# Patient Record
Sex: FEMALE | Race: WHITE | NOT HISPANIC OR LATINO | Employment: OTHER | URBAN - METROPOLITAN AREA
[De-identification: names, ages, dates, MRNs, and addresses within clinical notes are randomized per-mention and may not be internally consistent; named-entity substitution may affect disease eponyms.]

---

## 2018-03-06 ENCOUNTER — HISTORICAL (OUTPATIENT)
Dept: RADIOLOGY | Facility: HOSPITAL | Age: 63
End: 2018-03-06

## 2019-08-07 ENCOUNTER — HISTORICAL (OUTPATIENT)
Dept: RADIOLOGY | Facility: HOSPITAL | Age: 64
End: 2019-08-07

## 2019-08-23 ENCOUNTER — HISTORICAL (OUTPATIENT)
Dept: RADIOLOGY | Facility: HOSPITAL | Age: 64
End: 2019-08-23

## 2019-09-06 ENCOUNTER — HISTORICAL (OUTPATIENT)
Dept: RADIOLOGY | Facility: HOSPITAL | Age: 64
End: 2019-09-06

## 2019-10-11 LAB
ABS NEUT (OLG): 4.4 X10(3)/MCL (ref 1.5–6.9)
BUN SERPL-MCNC: 12 MG/DL (ref 10–20)
CALCIUM SERPL-MCNC: 8.7 MG/DL (ref 8–10.5)
CHLORIDE SERPL-SCNC: 108 MMOL/L (ref 100–108)
CO2 SERPL-SCNC: 28 MMOL/L (ref 21–35)
CREAT SERPL-MCNC: 0.74 MG/DL (ref 0.7–1.3)
CREAT/UREA NIT SERPL: 16
ERYTHROCYTE [DISTWIDTH] IN BLOOD BY AUTOMATED COUNT: 14 % (ref 11.5–17)
GLUCOSE SERPL-MCNC: 96 MG/DL (ref 75–116)
GROUP & RH: NORMAL
HCT VFR BLD AUTO: 37.4 % (ref 36–48)
HGB BLD-MCNC: 11.4 GM/DL (ref 12–16)
MCH RBC QN AUTO: 27 PG (ref 27–34)
MCHC RBC AUTO-ENTMCNC: 30 GM/DL (ref 31–36)
MCV RBC AUTO: 90 FL (ref 80–99)
PLATELET # BLD AUTO: 207 X10(3)/MCL (ref 140–400)
PMV BLD AUTO: 11 FL (ref 6.8–10)
POTASSIUM SERPL-SCNC: 4.6 MMOL/L (ref 3.6–5.2)
RBC # BLD AUTO: 4.16 X10(6)/MCL (ref 4.2–5.4)
SODIUM SERPL-SCNC: 142 MMOL/L (ref 135–145)
WBC # SPEC AUTO: 6.4 X10(3)/MCL (ref 4.5–11.5)

## 2019-10-15 ENCOUNTER — HISTORICAL (OUTPATIENT)
Dept: ANESTHESIOLOGY | Facility: HOSPITAL | Age: 64
End: 2019-10-15

## 2019-10-31 ENCOUNTER — HISTORICAL (OUTPATIENT)
Dept: ADMINISTRATIVE | Facility: HOSPITAL | Age: 64
End: 2019-10-31

## 2019-11-14 ENCOUNTER — HISTORICAL (OUTPATIENT)
Dept: RADIOLOGY | Facility: HOSPITAL | Age: 64
End: 2019-11-14

## 2021-03-19 ENCOUNTER — HISTORICAL (OUTPATIENT)
Dept: ADMINISTRATIVE | Facility: HOSPITAL | Age: 66
End: 2021-03-19

## 2021-03-19 LAB
ALBUMIN SERPL-MCNC: 3.8 G/DL (ref 3.8–4.8)
ALBUMIN/GLOB SERPL: 1.4 {RATIO} (ref 1.2–2.2)
ALP SERPL-CCNC: 110 IU/L (ref 39–117)
ALT SERPL-CCNC: 10 IU/L (ref 0–32)
AST SERPL-CCNC: 22 IU/L (ref 0–40)
BASOPHILS # BLD AUTO: 0 X10E3/UL (ref 0–0.2)
BASOPHILS NFR BLD AUTO: 0 %
BILIRUB SERPL-MCNC: <0.2 MG/DL (ref 0–1.2)
BUN SERPL-MCNC: 11 MG/DL (ref 8–27)
CALCIUM SERPL-MCNC: 8.7 MG/DL (ref 8.7–10.3)
CHLORIDE SERPL-SCNC: 108 MMOL/L (ref 96–106)
CHOLEST SERPL-MCNC: 169 MG/DL (ref 100–199)
CHOLEST/HDLC SERPL: 4.3 RATIO (ref 0–4.4)
CO2 SERPL-SCNC: 23 MMOL/L (ref 20–29)
CREAT SERPL-MCNC: 0.89 MG/DL (ref 0.57–1)
CREAT/UREA NIT SERPL: 12 (ref 12–28)
EOSINOPHIL # BLD AUTO: 0 X10E3/UL (ref 0–0.4)
EOSINOPHIL NFR BLD AUTO: 0 %
ERYTHROCYTE [DISTWIDTH] IN BLOOD BY AUTOMATED COUNT: 14.9 % (ref 11.7–15.4)
GLOBULIN SER-MCNC: 2.7 G/DL (ref 1.5–4.5)
GLUCOSE SERPL-MCNC: 119 MG/DL (ref 65–99)
HBA1C MFR BLD: 5.9 % (ref 4.8–5.6)
HCT VFR BLD AUTO: 32.4 % (ref 34–46.6)
HDLC SERPL-MCNC: 39 MG/DL
HGB BLD-MCNC: 11.7 G/DL (ref 11.1–15.9)
LDLC SERPL CALC-MCNC: 88 MG/DL (ref 0–99)
LYMPHOCYTES # BLD AUTO: 1.6 X10E3/UL (ref 0.7–3.1)
LYMPHOCYTES NFR BLD AUTO: 26 %
MCH RBC QN AUTO: 36.1 PG (ref 26.6–33)
MCHC RBC AUTO-ENTMCNC: 36.1 G/DL (ref 31.5–35.7)
MCV RBC AUTO: 100 FL (ref 79–97)
MONOCYTES # BLD AUTO: 0.4 X10E3/UL (ref 0.1–0.9)
MONOCYTES NFR BLD AUTO: 7 %
NEUTROPHILS # BLD AUTO: 4.2 X10E3/UL (ref 1.4–7)
NEUTROPHILS NFR BLD AUTO: 67 %
PLATELET # BLD AUTO: 240 X10E3/UL (ref 150–450)
POTASSIUM SERPL-SCNC: 4.4 MMOL/L (ref 3.5–5.2)
PROT SERPL-MCNC: 6.5 G/DL (ref 6–8.5)
RBC # BLD AUTO: 3.24 X10(6)/MCL (ref 3.77–5.28)
SODIUM SERPL-SCNC: 142 MMOL/L (ref 134–144)
TRIGL SERPL-MCNC: 249 MG/DL (ref 0–149)
VLDLC SERPL CALC-MCNC: 42 MG/DL (ref 5–40)
WBC # SPEC AUTO: 6.2 X10E3/UL (ref 3.4–10.8)

## 2021-03-24 ENCOUNTER — HISTORICAL (OUTPATIENT)
Dept: ADMINISTRATIVE | Facility: HOSPITAL | Age: 66
End: 2021-03-24

## 2021-04-09 ENCOUNTER — HISTORICAL (OUTPATIENT)
Dept: ADMINISTRATIVE | Facility: HOSPITAL | Age: 66
End: 2021-04-09

## 2021-04-12 ENCOUNTER — HISTORICAL (OUTPATIENT)
Dept: ENDOSCOPY | Facility: HOSPITAL | Age: 66
End: 2021-04-12

## 2021-09-24 LAB
ALBUMIN SERPL-MCNC: 3.8 G/DL (ref 3.4–5)
ALBUMIN/GLOB SERPL: 1.3 {RATIO}
ALP SERPL-CCNC: 134 U/L (ref 50–144)
ALT SERPL-CCNC: 13 U/L (ref 1–45)
ANION GAP SERPL CALC-SCNC: 10 MMOL/L (ref 7–16)
AST SERPL-CCNC: 24 U/L (ref 14–36)
BASOPHILS # BLD AUTO: 0.01 X10(3)/MCL (ref 0.01–0.08)
BASOPHILS NFR BLD AUTO: 0.2 % (ref 0.1–1.2)
BILIRUB SERPL-MCNC: 0.32 MG/DL (ref 0.1–1)
BUN SERPL-MCNC: 13 MG/DL (ref 7–20)
CALCIUM SERPL-MCNC: 9 MG/DL (ref 8.4–10.2)
CHLORIDE SERPL-SCNC: 107 MMOL/L (ref 94–110)
CHOLEST SERPL-MCNC: 201 MG/DL (ref 0–200)
CO2 SERPL-SCNC: 26 MMOL/L (ref 21–32)
CREAT SERPL-MCNC: 0.8 MG/DL (ref 0.52–1.04)
CREAT/UREA NIT SERPL: 16.3 (ref 12–20)
EOSINOPHIL # BLD AUTO: 0 X10(3)/MCL (ref 0.04–0.36)
EOSINOPHIL NFR BLD AUTO: 0 % (ref 0.7–7)
ERYTHROCYTE [DISTWIDTH] IN BLOOD BY AUTOMATED COUNT: 14.6 % (ref 11–14.5)
EST. AVERAGE GLUCOSE BLD GHB EST-MCNC: 111 MG/DL (ref 70–115)
GLOBULIN SER-MCNC: 2.9 G/DL (ref 2–3.9)
GLUCOSE SERPL-MCNC: 106 MGM./DL (ref 70–115)
HBA1C MFR BLD: 5.7 % (ref 4–6)
HCT VFR BLD AUTO: 39 % (ref 36–48)
HDLC SERPL-MCNC: 40 MG/DL (ref 40–60)
HGB BLD-MCNC: 11.8 G/DL (ref 11.8–16)
IMM GRANULOCYTES # BLD AUTO: 0.01 X10E3/UL (ref 0–0.03)
IMM GRANULOCYTES NFR BLD AUTO: 0.2 % (ref 0–0.5)
LDLC SERPL CALC-MCNC: 98.2 MG/DL (ref 30–100)
LYMPHOCYTES # BLD AUTO: 1.62 X10(3)/MCL (ref 1.16–3.74)
LYMPHOCYTES NFR BLD AUTO: 25.8 % (ref 20–55)
MCH RBC QN AUTO: 27.1 PG (ref 27–34)
MCHC RBC AUTO-ENTMCNC: 30.3 G/DL (ref 31–37)
MCV RBC AUTO: 89.4 FL (ref 79–99)
MONOCYTES # BLD AUTO: 0.43 X10(3)/MCL (ref 0.24–0.36)
MONOCYTES NFR BLD AUTO: 6.8 % (ref 4.7–12.5)
NEUTROPHILS # BLD AUTO: 4.22 X10(3)/MCL (ref 1.56–6.13)
NEUTROPHILS NFR BLD AUTO: 67 % (ref 37–73)
PLATELET # BLD AUTO: 269 X10(3)/MCL (ref 140–371)
PMV BLD AUTO: 12 FL (ref 9.4–12.4)
POTASSIUM SERPL-SCNC: 4.4 MMOL/L (ref 3.5–5.1)
PROT SERPL-MCNC: 6.7 G/DL (ref 6.3–8.2)
RBC # BLD AUTO: 4.36 X10(6)/MCL (ref 4–5.1)
SODIUM SERPL-SCNC: 143 MMOL/L (ref 135–145)
TRIGL SERPL-MCNC: 256 MG/DL (ref 30–200)
TSH SERPL-ACNC: 3.85 UIU/ML (ref 0.36–3.74)
WBC # SPEC AUTO: 6.3 X10(3)/MCL (ref 4–11.5)

## 2021-10-13 ENCOUNTER — HISTORICAL (OUTPATIENT)
Dept: ADMINISTRATIVE | Facility: HOSPITAL | Age: 66
End: 2021-10-13

## 2022-03-21 LAB
AGE: 66
ALBUMIN SERPL-MCNC: 4.3 G/DL (ref 3.4–5)
ALBUMIN/GLOB SERPL: 1.7 {RATIO}
ALP SERPL-CCNC: 101 U/L (ref 50–144)
ALT SERPL-CCNC: 14 U/L (ref 1–45)
ANION GAP SERPL CALC-SCNC: 9 MMOL/L (ref 2–13)
AST SERPL-CCNC: 31 U/L (ref 14–36)
BASOPHILS # BLD AUTO: 0.02 10*3/UL (ref 0.01–0.08)
BASOPHILS NFR BLD AUTO: 0.3 % (ref 0.1–1.2)
BILIRUB SERPL-MCNC: 0.4 MG/DL (ref 0–1)
BUN SERPL-MCNC: 20 MG/DL (ref 7–20)
CALCIUM SERPL-MCNC: 9.6 MG/DL (ref 8.4–10.2)
CHLORIDE SERPL-SCNC: 108 MMOL/L (ref 94–110)
CHOLEST SERPL-MCNC: 191 MG/DL (ref 0–200)
CO2 SERPL-SCNC: 25 MMOL/L (ref 21–32)
CREAT SERPL-MCNC: 0.7 MG/DL (ref 0.52–1.04)
CREAT/UREA NIT SERPL: 28.6 (ref 12–20)
EOSINOPHIL # BLD AUTO: 0 10*3/UL (ref 0.04–0.36)
EOSINOPHIL NFR BLD AUTO: 0 % (ref 0.7–7)
ERYTHROCYTE [DISTWIDTH] IN BLOOD BY AUTOMATED COUNT: 16 % (ref 11–14.5)
EST. AVERAGE GLUCOSE BLD GHB EST-MCNC: 93 MG/DL (ref 70–115)
GLOBULIN SER-MCNC: 2.6 G/DL (ref 2–3.9)
GLUCOSE SERPL-MCNC: 102 MG/DL (ref 70–115)
HBA1C MFR BLD: 5.1 % (ref 4–6)
HCT VFR BLD AUTO: 39.5 % (ref 36–48)
HDLC SERPL-MCNC: 42 MG/DL (ref 40–60)
HGB BLD-MCNC: 12.4 G/DL (ref 11.8–16)
IMM GRANULOCYTES # BLD AUTO: 0.01 10*3/UL (ref 0–0.03)
IMM GRANULOCYTES NFR BLD AUTO: 0.1 % (ref 0–0.5)
LDLC SERPL CALC-MCNC: 100 MG/DL (ref 30–100)
LYMPHOCYTES # BLD AUTO: 2.09 10*3/UL (ref 1.16–3.74)
LYMPHOCYTES NFR BLD AUTO: 30.2 % (ref 20–55)
MANUAL DIFF? (OHS): NORMAL
MCH RBC QN AUTO: 27.8 PG (ref 27–34)
MCHC RBC AUTO-ENTMCNC: 31.4 G/DL (ref 31–37)
MCV RBC AUTO: 88.6 FL (ref 79–99)
MONOCYTES # BLD AUTO: 0.52 10*3/UL (ref 0.24–0.36)
MONOCYTES NFR BLD AUTO: 7.5 % (ref 4.7–12.5)
NEUTROPHILS # BLD AUTO: 4.27 10*3/UL (ref 1.56–6.13)
NEUTROPHILS NFR BLD AUTO: 61.9 % (ref 37–73)
PLATELET # BLD AUTO: 270 10*3/UL (ref 140–371)
PMV BLD AUTO: 12.6 FL (ref 9.4–12.4)
POTASSIUM SERPL-SCNC: 4 MMOL/L (ref 3.5–5.1)
PROT SERPL-MCNC: 6.9 G/DL (ref 6.3–8.2)
RBC # BLD AUTO: 4.46 10*6/UL (ref 4–5.1)
SODIUM SERPL-SCNC: 142 MMOL/L (ref 135–145)
TRIGL SERPL-MCNC: 191 MG/DL (ref 30–200)
TSH SERPL-ACNC: 3.45 M[IU]/L (ref 0.36–3.74)
WBC # SPEC AUTO: 6.9 10*3/UL (ref 4–11.5)

## 2022-04-11 ENCOUNTER — HISTORICAL (OUTPATIENT)
Dept: ADMINISTRATIVE | Facility: HOSPITAL | Age: 67
End: 2022-04-11

## 2022-04-25 VITALS
SYSTOLIC BLOOD PRESSURE: 126 MMHG | OXYGEN SATURATION: 97 % | BODY MASS INDEX: 49.69 KG/M2 | HEIGHT: 63 IN | WEIGHT: 280.44 LBS | DIASTOLIC BLOOD PRESSURE: 88 MMHG

## 2022-04-30 NOTE — H&P
Patient:   Swati Connor            MRN: [id number]            FIN: [id number]               Age:   65 years     Sex:  Female     :  1955   Associated Diagnoses:   None   Author:   ANA Aguayo MD      Chief Complaint   Recurrence of upper abdominal pain some solid food dysphagia and chronic heartburn and reflux not controlled by medications      History of Present Illness   This is a 65-year-old individual resident of living in [place] patient of Dr. Hua Brown with over a decade of worsening burning regurgitation nonexertional chest discomfort that has not responded particularly well to medical management.  She is above ideal body weight.  Hypertensive, has osteoarthritis and glucose intolerance.  She is on no anticoagulants and denies any significant upper abdominal surgery.      Physical Examination   General:  Alert and oriented, Increased BMI.    Eye:  Pupils are equal, round and reactive to light.    Neck:  Supple.    Respiratory:  Respirations are non-labored.    Cardiovascular:  Normal rate.    Gastrointestinal:  Soft.       Impression and Plan   Gastro-soft reflux disease, hiatal hernia, rule out ulcer disease stricture less likely achalasia    ANA Aguayo M.D.

## 2022-04-30 NOTE — OP NOTE
Patient:   Swati Connor            MRN: [id number]            FIN: [id number]               Age:   63 years     Sex:  Female     :  1955   Associated Diagnoses:   None   Author:   Francisco Calle MD      Operative Note   Operative Information   Procedures Performed: InterStim removal, bladder injections of Botox.     Preoperative Diagnosis: Mixed urinary incontinence.     Postoperative Diagnosis: Same.     Surgeon: Francisco Calle MD.     Anesthesia: IV sedation.     Speciman Removed: InterStim generator and lead.     Description of Procedure/Findings/    Complications: Reason with the upper airway shows placed under IV sedation she was placed in the prone position she wasn't sure which side her InterStim had been placed because she had a place and removed supposedly delay we did see a scar patient's right upper buttock and felt that it was palpable in that area and incision was made over the area and after exploration of the pocket we cannot find InterStim generator.  Since left side again saw another incision site and it was very difficult to tell how where the generator was because the patient's body habitus we explored the patient's pocket on the right side that time was able to locate the generator generator lead was cut from Gen. generator was removed another in skin incision was made over the midline portion where the lead was and the lead was pulled up from its and source insertion site and appeared to be intact lead in the InterStim generator and removed without difficulties.  Cautery was used to obtain excellent hemostasis and all subcu incisions were closed with 3-0 Vicryl and 3-0 Vicryl was used to close the skin with a subcuticular fashion and the patient was changed positions in the dorsal lithotomy position she was again reprepped and redraped cystoscopy was performed noting normal bladder integrity and good flow of urine through both ureteral meatuses.  10 mL of dilute Botox was  then injected and half cc increments and a 5 x 4 pattern in the chest just beyond the level of the ureteral insertion into the bladder.  The time procedure was then terminated on instruments were removed under direct visualization she tolerated the procedure well was transferred to recovery in stable condition.     Esimated blood loss: No blood loss.     Findings: Normal-appearing bladder integrity with good urine flow through both ureteral meatus meatuses.     Complications: None.

## 2022-05-09 ENCOUNTER — HISTORICAL (OUTPATIENT)
Dept: ADMINISTRATIVE | Facility: HOSPITAL | Age: 67
End: 2022-05-09

## 2023-02-09 ENCOUNTER — TELEPHONE (OUTPATIENT)
Dept: FAMILY MEDICINE | Facility: CLINIC | Age: 68
End: 2023-02-09
Payer: MEDICARE

## 2023-02-09 DIAGNOSIS — E11.9 DIABETES MELLITUS WITHOUT COMPLICATION: Primary | ICD-10-CM

## 2023-02-09 RX ORDER — SEMAGLUTIDE 1.34 MG/ML
0.25 INJECTION, SOLUTION SUBCUTANEOUS
Qty: 1 PEN | Refills: 3 | Status: SHIPPED | OUTPATIENT
Start: 2023-02-09 | End: 2023-04-20

## 2023-02-09 RX ORDER — SEMAGLUTIDE 1.34 MG/ML
INJECTION, SOLUTION SUBCUTANEOUS
COMMUNITY
Start: 2023-01-04 | End: 2023-02-09 | Stop reason: SDUPTHER

## 2023-02-09 NOTE — TELEPHONE ENCOUNTER
----- Message from Anahi Castro sent at 2/9/2023 10:46 AM CST -----  Regarding: medication refill  Refill on ozempic sent to [facility] out patient [id number]  [phone]

## 2023-02-22 DIAGNOSIS — M19.90 OSTEOARTHRITIS, UNSPECIFIED OSTEOARTHRITIS TYPE, UNSPECIFIED SITE: Primary | ICD-10-CM

## 2023-02-22 RX ORDER — HYDROCODONE BITARTRATE AND ACETAMINOPHEN 10; 325 MG/1; MG/1
1 TABLET ORAL EVERY 6 HOURS PRN
COMMUNITY
Start: 2023-01-17 | End: 2023-02-22 | Stop reason: SDUPTHER

## 2023-02-22 RX ORDER — HYDROCODONE BITARTRATE AND ACETAMINOPHEN 10; 325 MG/1; MG/1
1 TABLET ORAL EVERY 6 HOURS PRN
Qty: 30 TABLET | Refills: 0 | Status: SHIPPED | OUTPATIENT
Start: 2023-02-22 | End: 2023-03-21 | Stop reason: SDUPTHER

## 2023-02-22 NOTE — TELEPHONE ENCOUNTER
----- Message from Laura Sandoval sent at 2/22/2023 10:49 AM CST -----  Regarding: Refil    Norco 10 mg-325 mg oral tablet      [facility]

## 2023-03-16 ENCOUNTER — TELEPHONE (OUTPATIENT)
Dept: FAMILY MEDICINE | Facility: CLINIC | Age: 68
End: 2023-03-16
Payer: MEDICARE

## 2023-03-16 DIAGNOSIS — F41.9 ANXIETY: Primary | ICD-10-CM

## 2023-03-16 RX ORDER — LISINOPRIL 40 MG/1
40 TABLET ORAL DAILY
COMMUNITY
Start: 2023-03-09 | End: 2023-07-26

## 2023-03-16 RX ORDER — LORAZEPAM 1 MG/1
2 TABLET ORAL NIGHTLY
COMMUNITY
Start: 2023-02-16 | End: 2023-07-05

## 2023-03-16 RX ORDER — LORAZEPAM 1 MG/1
2 TABLET ORAL NIGHTLY
Qty: 60 TABLET | Refills: 0 | Status: CANCELLED | OUTPATIENT
Start: 2023-03-16

## 2023-03-16 RX ORDER — ATORVASTATIN CALCIUM 40 MG/1
40 TABLET, FILM COATED ORAL NIGHTLY
COMMUNITY
Start: 2023-02-01 | End: 2024-01-26

## 2023-03-16 RX ORDER — CELECOXIB 200 MG/1
200 CAPSULE ORAL DAILY
COMMUNITY
Start: 2022-10-24 | End: 2023-07-26

## 2023-03-16 RX ORDER — MONTELUKAST SODIUM 10 MG/1
10 TABLET ORAL DAILY
COMMUNITY
Start: 2023-02-22 | End: 2023-07-17 | Stop reason: SDUPTHER

## 2023-03-16 RX ORDER — GABAPENTIN 600 MG/1
600 TABLET ORAL 3 TIMES DAILY
COMMUNITY
Start: 2023-03-09 | End: 2023-07-05

## 2023-03-21 DIAGNOSIS — M19.90 OSTEOARTHRITIS, UNSPECIFIED OSTEOARTHRITIS TYPE, UNSPECIFIED SITE: ICD-10-CM

## 2023-03-21 DIAGNOSIS — G47.00 INSOMNIA, UNSPECIFIED TYPE: Primary | ICD-10-CM

## 2023-03-21 RX ORDER — ZOLPIDEM TARTRATE 10 MG/1
10 TABLET ORAL NIGHTLY PRN
COMMUNITY
Start: 2022-12-22 | End: 2023-03-21 | Stop reason: SDUPTHER

## 2023-03-21 RX ORDER — ZOLPIDEM TARTRATE 10 MG/1
10 TABLET ORAL NIGHTLY PRN
Qty: 90 TABLET | Refills: 0 | Status: SHIPPED | OUTPATIENT
Start: 2023-03-21 | End: 2023-06-19 | Stop reason: SDUPTHER

## 2023-03-21 RX ORDER — HYDROCODONE BITARTRATE AND ACETAMINOPHEN 10; 325 MG/1; MG/1
1 TABLET ORAL EVERY 6 HOURS PRN
Qty: 30 TABLET | Refills: 0 | Status: SHIPPED | OUTPATIENT
Start: 2023-03-21 | End: 2023-04-20 | Stop reason: SDUPTHER

## 2023-04-17 PROCEDURE — 85025 COMPLETE CBC W/AUTO DIFF WBC: CPT | Performed by: FAMILY MEDICINE

## 2023-04-17 PROCEDURE — 83036 HEMOGLOBIN GLYCOSYLATED A1C: CPT | Performed by: FAMILY MEDICINE

## 2023-04-17 PROCEDURE — 80061 LIPID PANEL: CPT | Performed by: FAMILY MEDICINE

## 2023-04-17 PROCEDURE — 84443 ASSAY THYROID STIM HORMONE: CPT | Performed by: FAMILY MEDICINE

## 2023-04-17 PROCEDURE — 80053 COMPREHEN METABOLIC PANEL: CPT | Performed by: FAMILY MEDICINE

## 2023-04-20 ENCOUNTER — OFFICE VISIT (OUTPATIENT)
Dept: FAMILY MEDICINE | Facility: CLINIC | Age: 68
End: 2023-04-20
Payer: MEDICARE

## 2023-04-20 VITALS
OXYGEN SATURATION: 99 % | SYSTOLIC BLOOD PRESSURE: 128 MMHG | BODY MASS INDEX: 44.26 KG/M2 | DIASTOLIC BLOOD PRESSURE: 72 MMHG | TEMPERATURE: 97 F | HEIGHT: 63 IN | WEIGHT: 249.81 LBS | HEART RATE: 75 BPM

## 2023-04-20 DIAGNOSIS — E78.5 HYPERLIPIDEMIA, UNSPECIFIED HYPERLIPIDEMIA TYPE: ICD-10-CM

## 2023-04-20 DIAGNOSIS — I10 PRIMARY HYPERTENSION: ICD-10-CM

## 2023-04-20 DIAGNOSIS — M19.90 OSTEOARTHRITIS, UNSPECIFIED OSTEOARTHRITIS TYPE, UNSPECIFIED SITE: ICD-10-CM

## 2023-04-20 DIAGNOSIS — D64.9 ANEMIA, UNSPECIFIED TYPE: ICD-10-CM

## 2023-04-20 DIAGNOSIS — Z00.00 MEDICARE ANNUAL WELLNESS VISIT, SUBSEQUENT: ICD-10-CM

## 2023-04-20 DIAGNOSIS — F41.9 ANXIETY: ICD-10-CM

## 2023-04-20 DIAGNOSIS — G89.4 CHRONIC PAIN SYNDROME: Primary | ICD-10-CM

## 2023-04-20 DIAGNOSIS — E66.01 CLASS 3 SEVERE OBESITY DUE TO EXCESS CALORIES WITH SERIOUS COMORBIDITY AND BODY MASS INDEX (BMI) OF 40.0 TO 44.9 IN ADULT: ICD-10-CM

## 2023-04-20 DIAGNOSIS — E11.9 TYPE 2 DIABETES MELLITUS WITHOUT COMPLICATION, WITHOUT LONG-TERM CURRENT USE OF INSULIN: ICD-10-CM

## 2023-04-20 DIAGNOSIS — F32.A DEPRESSIVE DISORDER: ICD-10-CM

## 2023-04-20 PROBLEM — E66.813 CLASS 3 SEVERE OBESITY WITH BODY MASS INDEX (BMI) OF 40.0 TO 44.9 IN ADULT: Status: ACTIVE | Noted: 2023-04-20

## 2023-04-20 PROCEDURE — G0439 PR MEDICARE ANNUAL WELLNESS SUBSEQUENT VISIT: ICD-10-PCS | Mod: ,,, | Performed by: FAMILY MEDICINE

## 2023-04-20 PROCEDURE — G0439 PPPS, SUBSEQ VISIT: HCPCS | Mod: ,,, | Performed by: FAMILY MEDICINE

## 2023-04-20 RX ORDER — VENLAFAXINE HYDROCHLORIDE 150 MG/1
150 CAPSULE, EXTENDED RELEASE ORAL 2 TIMES DAILY
COMMUNITY
Start: 2023-04-06 | End: 2023-10-24 | Stop reason: SDUPTHER

## 2023-04-20 RX ORDER — HYDROCODONE BITARTRATE AND ACETAMINOPHEN 10; 325 MG/1; MG/1
1 TABLET ORAL EVERY 6 HOURS PRN
Qty: 30 TABLET | Refills: 0 | Status: SHIPPED | OUTPATIENT
Start: 2023-04-20 | End: 2023-05-19 | Stop reason: SDUPTHER

## 2023-04-20 NOTE — PROGRESS NOTES
SUBJECTIVE:  Swati Connor is a 67 y.o. female here for Follow-up (6 month lab f/u)      Follow-up  Associated symptoms include arthralgias and myalgias. Pertinent negatives include no abdominal pain, chest pain, congestion, coughing, fatigue, fever, headaches, joint swelling, rash, sore throat or weakness.   Here for annual Medicare wellness and follow-up on chronic medical conditions.  See assessment and plan for individual list of issues.  Kyree allergies, medications, history, and problem list were updated as appropriate.    Review of Systems   Constitutional:  Negative for activity change, appetite change, fatigue and fever.   HENT:  Negative for congestion, ear pain, hearing loss, sore throat and trouble swallowing.    Eyes:  Negative for photophobia, pain, redness and visual disturbance.   Respiratory:  Negative for cough, chest tightness, shortness of breath and wheezing.    Cardiovascular:  Negative for chest pain, palpitations and leg swelling.   Gastrointestinal:  Negative for abdominal distention, abdominal pain and blood in stool.   Endocrine: Negative for cold intolerance, heat intolerance, polydipsia and polyuria.   Genitourinary:  Negative for difficulty urinating, dysuria and frequency.   Musculoskeletal:  Positive for arthralgias, back pain and myalgias. Negative for gait problem and joint swelling.   Skin:  Negative for color change, pallor and rash.   Allergic/Immunologic: Negative.    Neurological:  Negative for dizziness, seizures, speech difficulty, weakness and headaches.   Hematological:  Negative for adenopathy. Does not bruise/bleed easily.   Psychiatric/Behavioral:  Negative for agitation and confusion.     A comprehensive review of symptoms was completed and negative except as noted above.    Recent Results (from the past 504 hour(s))   Comprehensive Metabolic Panel    Collection Time: 04/17/23  8:35 AM   Result Value Ref Range    Sodium Level 142 135 - 145 mmol/L    Potassium  Level 4.0 3.5 - 5.1 mmol/L    Chloride 111 (H) 98 - 110 mmol/L    Carbon Dioxide 21 21 - 32 mmol/L    Glucose Level 118 (H) 70 - 115 mg/dL    Blood Urea Nitrogen 17.0 7.0 - 20.0 mg/dL    Creatinine 0.95 0.66 - 1.25 mg/dL    Calcium Level Total 9.5 8.4 - 10.2 mg/dL    Protein Total 6.9 6.3 - 8.2 gm/dL    Albumin Level 3.9 3.4 - 5.0 g/dL    Globulin 3.0 2.0 - 3.9 gm/dL    Albumin/Globulin Ratio 1.3 ratio    Bilirubin Total 0.3 0.0 - 1.0 mg/dL    Alkaline Phosphatase 128 50 - 144 unit/L    Alanine Aminotransferase 12 1 - 45 unit/L    Aspartate Aminotransferase 27 14 - 36 unit/L    eGFR 66 mls/min/1.73/m2    Anion Gap 10.0 2.0 - 13.0 mEq/L    BUN/Creatinine Ratio 18 12 - 20   Hemoglobin A1C    Collection Time: 04/17/23  8:35 AM   Result Value Ref Range    Hemoglobin A1c 5.3 4.0 - 6.0 %    Estimated Average Glucose 105.4 70.0 - 115.0 mg/dL   Lipid Panel    Collection Time: 04/17/23  8:35 AM   Result Value Ref Range    Cholesterol Total 184 0 - 200 mg/dL    HDL Cholesterol 38 (L) 40 - 60 mg/dL    Triglyceride 247 (H) 30 - 200 mg/dL    LDL Cholesterol Direct 82.8 30.0 - 100.0 mg/dL   TSH    Collection Time: 04/17/23  8:35 AM   Result Value Ref Range    Thyroid Stimulating Hormone 5.870 (H) 0.360 - 3.740 uIU/mL   CBC with Differential    Collection Time: 04/17/23  8:35 AM   Result Value Ref Range    WBC 8.4 4.0 - 11.5 x10(3)/mcL    RBC 3.87 (L) 4.00 - 5.10 x10(6)/mcL    Hgb 12.6 11.8 - 16.0 g/dL    Hct 35.2 (L) 36.0 - 48.0 %    MCV 91.0 79.0 - 99.0 fL    MCH 32.6 27.0 - 34.0 pg    MCHC 35.8 31.0 - 37.0 g/dL    RDW 14.0 11.0 - 14.5 %    Platelet 300 140 - 371 x10(3)/mcL    MPV 12.5 (H) 9.4 - 12.4 fL    Neut % 72.3 37 - 73 %    Lymph % 21.9 20 - 55 %    Mono % 5.2 4.7 - 12.5 %    Eos % 0.0 (L) 0.7 - 7 %    Basophil % 0.2 0.1 - 1.2 %    Lymph # 1.84 1.16 - 3.74 x10(3)/mcL    Neut # 6.09 1.56 - 6.13 x10(3)/mcL    Mono # 0.44 (H) 0.24 - 0.36 x10(3)/mcL    Eos # 0.00 (L) 0.04 - 0.36 x10(3)/mcL    Baso # 0.02 0.01 - 0.08  "x10(3)/mcL    IG# 0.03 0.0001 - 0.031 x10(3)/mcL    IG% 0.4 0 - 0.5 %    NRBC% 0.0 0 - 1 %       OBJECTIVE:  Vital signs  Vitals:    04/20/23 1104   BP: 128/72   BP Location: Left arm   Pulse: 75   Temp: 96.8 °F (36 °C)   TempSrc: Temporal   SpO2: 99%   Weight: 113.3 kg (249 lb 12.8 oz)   Height: 5' 2.6" (1.59 m)        Physical Exam  Constitutional:       Appearance: Normal appearance.   HENT:      Head: Normocephalic and atraumatic.      Right Ear: External ear normal.      Left Ear: External ear normal.      Nose: Nose normal.      Mouth/Throat:      Mouth: Mucous membranes are moist.      Pharynx: Oropharynx is clear.   Eyes:      Extraocular Movements: Extraocular movements intact.      Conjunctiva/sclera: Conjunctivae normal.      Pupils: Pupils are equal, round, and reactive to light.   Cardiovascular:      Rate and Rhythm: Normal rate and regular rhythm.      Heart sounds: Normal heart sounds. No murmur heard.  Pulmonary:      Effort: Pulmonary effort is normal.      Breath sounds: Normal breath sounds. No wheezing or rhonchi.   Abdominal:      General: Abdomen is flat.      Palpations: Abdomen is soft.   Musculoskeletal:         General: Normal range of motion.      Cervical back: Normal range of motion and neck supple.      Comments: She stands up a little slow and walks with a little antalgic gait from pain   Skin:     General: Skin is warm and dry.   Neurological:      General: No focal deficit present.      Mental Status: She is alert and oriented to person, place, and time.      Gait: Gait abnormal.   Psychiatric:         Mood and Affect: Mood normal.         Behavior: Behavior normal.         Thought Content: Thought content normal.         Judgment: Judgment normal.        ASSESSMENT/PLAN:  1. Chronic pain syndrome  She is currently on Norco 10 mg that she takes in the morning to try to help her get through her work she has to do at her house.  She says the pain is the biggest problem she is having " now she is also on Celebrex daily and Neurontin but does not feel it helps much.    2. Class 3 severe obesity due to excess calories with serious comorbidity and body mass index (BMI) of 40.0 to 44.9 in adult  She was losing weight with a GLP 1 agonist however it was becoming too expensive so she does not want to take this anymore    3. Depressive disorder  Continue venlafaxine    4. Anxiety      5. Hyperlipidemia, unspecified hyperlipidemia type  LDL 82 HDL 38 triglycerides 247 on atorvastatin 40 mg daily    6. Primary hypertension  Blood pressure is well controlled lisinopril  Overview:  Last Assessment & Plan:   Formatting of this note might be different from the original.  - takes lisinopril  - The current medical regimen is effective;  continue present plan and medications.      7. Anemia, unspecified type  Hemoglobin is currently 12.6 and normal with a normal MCV    8. Type 2 diabetes mellitus without complication, without long-term current use of insulin  A1c is 5.3    9. Osteoarthritis, unspecified osteoarthritis type, unspecified site    -     HYDROcodone-acetaminophen (NORCO)  mg per tablet; Take 1 tablet by mouth every 6 (six) hours as needed for Pain.  Dispense: 30 tablet; Refill: 0    10. Medicare annual wellness visit, subsequent  Up-to-date on colon cancer screening.  She does not want to do mammogram    She is not currently getting any other Medicare services    I offered to discuss advanced care planning,  and how to pick a person who would make decisions for you if you were unable to make them for herself, called a health care power of , and what kind of decisions you might make such as use of life-sustaining treatments such as ventilators and tube feeding when faced with a life-limiting illness recorded on a living will that they will need to know.( How to be cared for as you near the end of your natural life)    Patient is interested in learning more about how to make advance  directives.  I provided them paperwork and offered to discuss this with them.          Follow Up:  Follow up in about 3 months (around 7/20/2023).

## 2023-05-02 ENCOUNTER — TELEPHONE (OUTPATIENT)
Dept: FAMILY MEDICINE | Facility: CLINIC | Age: 68
End: 2023-05-02
Payer: MEDICARE

## 2023-05-02 DIAGNOSIS — K44.9 HERNIA, HIATAL: Primary | ICD-10-CM

## 2023-05-03 ENCOUNTER — TELEPHONE (OUTPATIENT)
Dept: FAMILY MEDICINE | Facility: CLINIC | Age: 68
End: 2023-05-03
Payer: MEDICARE

## 2023-05-05 ENCOUNTER — OFFICE VISIT (OUTPATIENT)
Dept: FAMILY MEDICINE | Facility: CLINIC | Age: 68
End: 2023-05-05
Payer: MEDICARE

## 2023-05-05 VITALS
HEART RATE: 75 BPM | HEIGHT: 63 IN | BODY MASS INDEX: 44.54 KG/M2 | TEMPERATURE: 98 F | OXYGEN SATURATION: 94 % | DIASTOLIC BLOOD PRESSURE: 60 MMHG | WEIGHT: 251.38 LBS | SYSTOLIC BLOOD PRESSURE: 112 MMHG

## 2023-05-05 DIAGNOSIS — R50.9 FEVER, UNSPECIFIED FEVER CAUSE: ICD-10-CM

## 2023-05-05 DIAGNOSIS — J18.9 PNEUMONIA OF LEFT UPPER LOBE DUE TO INFECTIOUS ORGANISM: ICD-10-CM

## 2023-05-05 DIAGNOSIS — J10.1 INFLUENZA A: ICD-10-CM

## 2023-05-05 DIAGNOSIS — R05.1 ACUTE COUGH: Primary | ICD-10-CM

## 2023-05-05 LAB
CTP QC/QA: YES
CTP QC/QA: YES
FLUAV AG NPH QL: POSITIVE
FLUBV AG NPH QL: NEGATIVE
SARS-COV-2 AG RESP QL IA.RAPID: NEGATIVE

## 2023-05-05 PROCEDURE — 87811 SARS-COV-2 COVID19 W/OPTIC: CPT | Mod: QW,RHCUB | Performed by: FAMILY MEDICINE

## 2023-05-05 PROCEDURE — 87400 INFLUENZA A/B EACH AG IA: CPT | Mod: 59,QW,RHCUB | Performed by: FAMILY MEDICINE

## 2023-05-05 PROCEDURE — 99214 PR OFFICE/OUTPT VISIT, EST, LEVL IV, 30-39 MIN: ICD-10-PCS | Mod: ,,, | Performed by: FAMILY MEDICINE

## 2023-05-05 PROCEDURE — 99214 OFFICE O/P EST MOD 30 MIN: CPT | Mod: ,,, | Performed by: FAMILY MEDICINE

## 2023-05-05 RX ORDER — LEVOFLOXACIN 750 MG/1
750 TABLET ORAL DAILY
Qty: 7 TABLET | Refills: 0 | Status: SHIPPED | OUTPATIENT
Start: 2023-05-05 | End: 2023-05-12

## 2023-05-05 RX ORDER — SULFAMETHOXAZOLE AND TRIMETHOPRIM 800; 160 MG/1; MG/1
1 TABLET ORAL 2 TIMES DAILY
Qty: 14 TABLET | Refills: 0 | Status: SHIPPED | OUTPATIENT
Start: 2023-05-05 | End: 2023-05-12

## 2023-05-05 RX ORDER — OSELTAMIVIR PHOSPHATE 75 MG/1
75 CAPSULE ORAL 2 TIMES DAILY
Qty: 10 CAPSULE | Refills: 0 | Status: SHIPPED | OUTPATIENT
Start: 2023-05-05 | End: 2023-05-10

## 2023-05-05 NOTE — PROGRESS NOTES
SUBJECTIVE:  Swati Connor is a 67 y.o. female here for Sore Throat, Cough, and Shortness of Breath      HPI  Patient developed fever of 103 2 nights ago.  She has been having a cough when feeling very tired.  Temperature yesterday only went to 100.  Today she is a little short of breath when she tries to walk.  No hemoptysis.  She has some difficulty taking a deep breath because it hurts.  Swati's allergies, medications, history, and problem list were updated as appropriate.    Review of Systems   See HPI    Recent Results (from the past 504 hour(s))   Comprehensive Metabolic Panel    Collection Time: 04/17/23  8:35 AM   Result Value Ref Range    Sodium Level 142 135 - 145 mmol/L    Potassium Level 4.0 3.5 - 5.1 mmol/L    Chloride 111 (H) 98 - 110 mmol/L    Carbon Dioxide 21 21 - 32 mmol/L    Glucose Level 118 (H) 70 - 115 mg/dL    Blood Urea Nitrogen 17.0 7.0 - 20.0 mg/dL    Creatinine 0.95 0.66 - 1.25 mg/dL    Calcium Level Total 9.5 8.4 - 10.2 mg/dL    Protein Total 6.9 6.3 - 8.2 gm/dL    Albumin Level 3.9 3.4 - 5.0 g/dL    Globulin 3.0 2.0 - 3.9 gm/dL    Albumin/Globulin Ratio 1.3 ratio    Bilirubin Total 0.3 0.0 - 1.0 mg/dL    Alkaline Phosphatase 128 50 - 144 unit/L    Alanine Aminotransferase 12 1 - 45 unit/L    Aspartate Aminotransferase 27 14 - 36 unit/L    eGFR 66 mls/min/1.73/m2    Anion Gap 10.0 2.0 - 13.0 mEq/L    BUN/Creatinine Ratio 18 12 - 20   Hemoglobin A1C    Collection Time: 04/17/23  8:35 AM   Result Value Ref Range    Hemoglobin A1c 5.3 4.0 - 6.0 %    Estimated Average Glucose 105.4 70.0 - 115.0 mg/dL   Lipid Panel    Collection Time: 04/17/23  8:35 AM   Result Value Ref Range    Cholesterol Total 184 0 - 200 mg/dL    HDL Cholesterol 38 (L) 40 - 60 mg/dL    Triglyceride 247 (H) 30 - 200 mg/dL    LDL Cholesterol Direct 82.8 30.0 - 100.0 mg/dL   TSH    Collection Time: 04/17/23  8:35 AM   Result Value Ref Range    Thyroid Stimulating Hormone 5.870 (H) 0.360 - 3.740 uIU/mL   CBC with  "Differential    Collection Time: 04/17/23  8:35 AM   Result Value Ref Range    WBC 8.4 4.0 - 11.5 x10(3)/mcL    RBC 3.87 (L) 4.00 - 5.10 x10(6)/mcL    Hgb 12.6 11.8 - 16.0 g/dL    Hct 35.2 (L) 36.0 - 48.0 %    MCV 91.0 79.0 - 99.0 fL    MCH 32.6 27.0 - 34.0 pg    MCHC 35.8 31.0 - 37.0 g/dL    RDW 14.0 11.0 - 14.5 %    Platelet 300 140 - 371 x10(3)/mcL    MPV 12.5 (H) 9.4 - 12.4 fL    Neut % 72.3 37 - 73 %    Lymph % 21.9 20 - 55 %    Mono % 5.2 4.7 - 12.5 %    Eos % 0.0 (L) 0.7 - 7 %    Basophil % 0.2 0.1 - 1.2 %    Lymph # 1.84 1.16 - 3.74 x10(3)/mcL    Neut # 6.09 1.56 - 6.13 x10(3)/mcL    Mono # 0.44 (H) 0.24 - 0.36 x10(3)/mcL    Eos # 0.00 (L) 0.04 - 0.36 x10(3)/mcL    Baso # 0.02 0.01 - 0.08 x10(3)/mcL    IG# 0.03 0.0001 - 0.031 x10(3)/mcL    IG% 0.4 0 - 0.5 %    NRBC% 0.0 0 - 1 %   POCT Influenza A/B    Collection Time: 05/05/23 11:23 AM   Result Value Ref Range    Rapid Influenza A Ag Positive (A) Negative    Rapid Influenza B Ag Negative Negative     Acceptable Yes    SARS Coronavirus 2 Antigen, POCT Manual Read    Collection Time: 05/05/23 11:30 AM   Result Value Ref Range    SARS Coronavirus 2 Antigen Negative Negative     Acceptable Yes        OBJECTIVE:  Vital signs  Vitals:    05/05/23 1039   BP: 112/60   BP Location: Right arm   Patient Position: Sitting   BP Method: Large (Manual)   Pulse: 75   Temp: 98.1 °F (36.7 °C)   TempSrc: Temporal   SpO2: (!) 94%   Weight: 114 kg (251 lb 6.4 oz)   Height: 5' 2.6" (1.59 m)        Physical Exam lungs with some crackles in the left lower lung region, she is also splinting    ASSESSMENT/PLAN:  1. Acute cough  Chest x-ray shows infiltrate in the left upper lobe portion  -     X-Ray Chest PA And Lateral; Future; Expected date: 05/05/2023  -     POCT Influenza A/B  -     SARS Coronavirus 2 Antigen, POCT Manual Read    2. Fever, unspecified fever cause  Flu test is positive for influenza A  -     X-Ray Chest PA And Lateral; Future; " Expected date: 05/05/2023  -     POCT Influenza A/B  -     SARS Coronavirus 2 Antigen, POCT Manual Read    3. Pneumonia of left upper lobe due to infectious organism  I am concerned about a secondary pneumonia.  We will treat with Levaquin and Bactrim to cover staph    4. Influenza A  We will give Tamiflu since she is within the 48 hour window and having an infiltrate    Oxygen level is a little low today and I told her if things get worse over the weekend to go into the emergency room for hospitalization.  Other orders  -     levoFLOXacin (LEVAQUIN) 750 MG tablet; Take 1 tablet (750 mg total) by mouth once daily. for 7 days  Dispense: 7 tablet; Refill: 0  -     sulfamethoxazole-trimethoprim 800-160mg (BACTRIM DS) 800-160 mg Tab; Take 1 tablet by mouth 2 (two) times daily. for 7 days  Dispense: 14 tablet; Refill: 0         Follow Up:  Follow up in about 1 week (around 5/12/2023).

## 2023-05-11 ENCOUNTER — OFFICE VISIT (OUTPATIENT)
Dept: FAMILY MEDICINE | Facility: CLINIC | Age: 68
End: 2023-05-11
Payer: MEDICARE

## 2023-05-11 ENCOUNTER — HOSPITAL ENCOUNTER (EMERGENCY)
Facility: HOSPITAL | Age: 68
Discharge: HOME | End: 2023-05-11
Attending: FAMILY MEDICINE
Payer: MEDICARE

## 2023-05-11 VITALS
HEIGHT: 63 IN | DIASTOLIC BLOOD PRESSURE: 82 MMHG | WEIGHT: 241.81 LBS | BODY MASS INDEX: 42.84 KG/M2 | SYSTOLIC BLOOD PRESSURE: 102 MMHG | TEMPERATURE: 96 F | OXYGEN SATURATION: 97 % | HEART RATE: 107 BPM

## 2023-05-11 VITALS
DIASTOLIC BLOOD PRESSURE: 71 MMHG | HEART RATE: 61 BPM | RESPIRATION RATE: 18 BRPM | BODY MASS INDEX: 42.7 KG/M2 | SYSTOLIC BLOOD PRESSURE: 110 MMHG | OXYGEN SATURATION: 98 % | WEIGHT: 241 LBS | TEMPERATURE: 97 F | HEIGHT: 63 IN

## 2023-05-11 DIAGNOSIS — R05.9 COUGH: ICD-10-CM

## 2023-05-11 DIAGNOSIS — E86.0 DEHYDRATION: ICD-10-CM

## 2023-05-11 DIAGNOSIS — J18.9 PNEUMONIA OF LEFT UPPER LOBE DUE TO INFECTIOUS ORGANISM: Primary | ICD-10-CM

## 2023-05-11 DIAGNOSIS — J10.1 INFLUENZA A: Primary | ICD-10-CM

## 2023-05-11 LAB
ALBUMIN SERPL-MCNC: 4.2 G/DL (ref 3.4–5)
ALBUMIN/GLOB SERPL: 1.3 RATIO
ALP SERPL-CCNC: 149 UNIT/L (ref 50–144)
ALT SERPL-CCNC: 28 UNIT/L (ref 1–45)
ANION GAP SERPL CALC-SCNC: 11 MEQ/L (ref 2–13)
AST SERPL-CCNC: 36 UNIT/L (ref 14–36)
BASOPHILS # BLD AUTO: 0.02 X10(3)/MCL (ref 0.01–0.08)
BASOPHILS NFR BLD AUTO: 0.1 % (ref 0.1–1.2)
BILIRUBIN DIRECT+TOT PNL SERPL-MCNC: 0.5 MG/DL (ref 0–1)
BUN SERPL-MCNC: 15 MG/DL (ref 7–20)
CALCIUM SERPL-MCNC: 9.5 MG/DL (ref 8.4–10.2)
CHLORIDE SERPL-SCNC: 107 MMOL/L (ref 98–110)
CO2 SERPL-SCNC: 18 MMOL/L (ref 21–32)
CREAT SERPL-MCNC: 1.26 MG/DL (ref 0.66–1.25)
CREAT/UREA NIT SERPL: 12 (ref 12–20)
EOSINOPHIL # BLD AUTO: 0 X10(3)/MCL (ref 0.04–0.36)
EOSINOPHIL NFR BLD AUTO: 0 % (ref 0.7–7)
ERYTHROCYTE [DISTWIDTH] IN BLOOD BY AUTOMATED COUNT: 13.8 % (ref 11–14.5)
GFR SERPLBLD CREATININE-BSD FMLA CKD-EPI: 47 MLS/MIN/1.73/M2
GLOBULIN SER-MCNC: 3.3 GM/DL (ref 2–3.9)
GLUCOSE SERPL-MCNC: 165 MG/DL (ref 70–115)
HCT VFR BLD AUTO: 41.2 % (ref 36–48)
HGB BLD-MCNC: 13.1 G/DL (ref 11.8–16)
IMM GRANULOCYTES # BLD AUTO: 0.08 X10(3)/MCL (ref 0–0.03)
IMM GRANULOCYTES NFR BLD AUTO: 0.6 % (ref 0–0.5)
LYMPHOCYTES # BLD AUTO: 1.18 X10(3)/MCL (ref 1.16–3.74)
LYMPHOCYTES NFR BLD AUTO: 8.2 % (ref 20–55)
MCH RBC QN AUTO: 27.4 PG (ref 27–34)
MCHC RBC AUTO-ENTMCNC: 31.8 G/DL (ref 31–37)
MCV RBC AUTO: 86.2 FL (ref 79–99)
MONOCYTES # BLD AUTO: 0.75 X10(3)/MCL (ref 0.24–0.36)
MONOCYTES NFR BLD AUTO: 5.2 % (ref 4.7–12.5)
NEUTROPHILS # BLD AUTO: 12.28 X10(3)/MCL (ref 1.56–6.13)
NEUTROPHILS NFR BLD AUTO: 85.9 % (ref 37–73)
NRBC BLD AUTO-RTO: 0 % (ref 0–1)
PLATELET # BLD AUTO: 390 X10(3)/MCL (ref 140–371)
PMV BLD AUTO: 10.4 FL (ref 9.4–12.4)
POTASSIUM SERPL-SCNC: 4 MMOL/L (ref 3.5–5.1)
PROT SERPL-MCNC: 7.5 GM/DL (ref 6.3–8.2)
RBC # BLD AUTO: 4.78 X10(6)/MCL (ref 4–5.1)
SODIUM SERPL-SCNC: 136 MMOL/L (ref 135–145)
WBC # SPEC AUTO: 14.31 X10(3)/MCL (ref 4–11.5)

## 2023-05-11 PROCEDURE — 99214 PR OFFICE/OUTPT VISIT, EST, LEVL IV, 30-39 MIN: ICD-10-PCS | Mod: ,,, | Performed by: FAMILY MEDICINE

## 2023-05-11 PROCEDURE — 96360 HYDRATION IV INFUSION INIT: CPT

## 2023-05-11 PROCEDURE — 80053 COMPREHEN METABOLIC PANEL: CPT | Performed by: FAMILY MEDICINE

## 2023-05-11 PROCEDURE — 36415 COLL VENOUS BLD VENIPUNCTURE: CPT | Performed by: FAMILY MEDICINE

## 2023-05-11 PROCEDURE — 25000003 PHARM REV CODE 250: Performed by: FAMILY MEDICINE

## 2023-05-11 PROCEDURE — 99214 OFFICE O/P EST MOD 30 MIN: CPT | Mod: ,,, | Performed by: FAMILY MEDICINE

## 2023-05-11 PROCEDURE — 85025 COMPLETE CBC W/AUTO DIFF WBC: CPT | Performed by: FAMILY MEDICINE

## 2023-05-11 PROCEDURE — 99284 EMERGENCY DEPT VISIT MOD MDM: CPT | Mod: 25

## 2023-05-11 RX ADMIN — SODIUM CHLORIDE 1000 ML: 9 INJECTION, SOLUTION INTRAVENOUS at 11:05

## 2023-05-11 NOTE — ED NOTES
Pt to ED 3 via wc with c/o shoulder pain, productive cough, n/v/d, dry tongue, weakness, dizziness, body aches, fever, and loss of appetite.  States Dr. Emerson sent her in with reports of confirmed flu+ test, possible pneumonia - states that she is recently completed a round abx.  States that she did NOT bring home medications in for review.

## 2023-05-11 NOTE — PROGRESS NOTES
"SUBJECTIVE:  Swati Connor is a 67 y.o. female here for Follow-up (1 week )      HPI  Patient is here for follow-up from pneumonia last week.  He is diagnosed with left upper lobe pneumonia and started on Levaquin and Bactrim.  We used Bactrim because she also had influenza A.  She completed the 7 days of Levaquin but the last few days could not take the Bactrim because it was making her nauseated.  She is been vomiting and not able to hold anything down.  She is very weak.  She has had decrease in urination.    Enids allergies, medications, history, and problem list were updated as appropriate.    Review of Systems   See HPI.    Recent Results (from the past 504 hour(s))   POCT Influenza A/B    Collection Time: 05/05/23 11:23 AM   Result Value Ref Range    Rapid Influenza A Ag Positive (A) Negative    Rapid Influenza B Ag Negative Negative     Acceptable Yes    SARS Coronavirus 2 Antigen, POCT Manual Read    Collection Time: 05/05/23 11:30 AM   Result Value Ref Range    SARS Coronavirus 2 Antigen Negative Negative     Acceptable Yes        OBJECTIVE:  Vital signs  Vitals:    05/11/23 1010   BP: 102/82   Pulse: 107   Temp: 96.3 °F (35.7 °C)   TempSrc: Temporal   SpO2: 97%   Weight: 109.7 kg (241 lb 12.8 oz)   Height: 5' 2.6" (1.59 m)        Physical Exam patient looks very weak.  Her tongue is very dry with dry mucosa.  She is lost 10 lb in the last week.  She is tachycardic.  Lungs still with some rhonchi in the left mid and upper lung zones    ASSESSMENT/PLAN:  1. Pneumonia of left upper lobe due to infectious organism      2. Dehydration  Patient looked fairly ill at this time.  I think she needs hospital admission and I sent her straight to the emergency room where she can get initial labs and repeat chest x-ray.  I am concerned that she may have acute kidney injury from dehydration in the Bactrim.         Follow Up:  No follow-ups on file.            "

## 2023-05-11 NOTE — ED PROVIDER NOTES
Encounter Date: 5/11/2023       History     Chief Complaint   Patient presents with    Influenza     PT WAS SENT BY MD WITH C/O FLU, PNEUMONIA, AND DEHYDRATION. PT AMB TO ED WITH C/O COUGHING UP YELLOW MUCUS  AND BODY ACHES  X 5 DAYS,      Patient has been fighting a diagnosed with the flu with a touch of pneumonia for the past week.  She followed up with her PCP today, he felt that she was dehydrated so he sent her to the emergency room for IV fluids and evaluation.  Patient is concerned about dehydration.    The history is provided by the patient.   Review of patient's allergies indicates:  No Known Allergies  Past Medical History:   Diagnosis Date    High cholesterol     Hypertension      Past Surgical History:   Procedure Laterality Date    HYSTERECTOMY       Family History   Problem Relation Age of Onset    Breast cancer Mother     Pancreatic cancer Mother     Heart failure Father      Social History     Tobacco Use    Smoking status: Never    Smokeless tobacco: Never   Substance Use Topics    Alcohol use: Not Currently    Drug use: Never     Review of Systems   Constitutional:  Positive for fatigue and fever.   HENT:  Negative for sore throat.    Respiratory:  Positive for cough. Negative for shortness of breath.    Cardiovascular:  Negative for chest pain.   Gastrointestinal:  Negative for nausea.   Genitourinary:  Negative for dysuria.   Musculoskeletal:  Negative for back pain.   Skin:  Negative for rash.   Neurological:  Negative for weakness.   Hematological:  Does not bruise/bleed easily.     Physical Exam     Initial Vitals [05/11/23 1054]   BP Pulse Resp Temp SpO2   (!) 157/103 97 (!) 22 96.6 °F (35.9 °C) 98 %      MAP       --         Physical Exam    Nursing note and vitals reviewed.  Constitutional: She appears well-developed and well-nourished.   HENT:   Head: Normocephalic and atraumatic.   Eyes: EOM are normal. Pupils are equal, round, and reactive to light.   Neck: Neck supple.   Normal range of  motion.  Cardiovascular:  Normal rate, regular rhythm and normal heart sounds.           Pulmonary/Chest: Breath sounds normal.   Abdominal: Abdomen is soft. Bowel sounds are normal. There is no abdominal tenderness.   Musculoskeletal:         General: No edema. Normal range of motion.      Cervical back: Normal range of motion and neck supple.     Neurological: She is alert and oriented to person, place, and time.   Skin: Skin is warm and dry. Capillary refill takes less than 2 seconds.   Psychiatric: She has a normal mood and affect.       ED Course   Procedures  Labs Reviewed   COMPREHENSIVE METABOLIC PANEL - Abnormal; Notable for the following components:       Result Value    Carbon Dioxide 18 (*)     Glucose Level 165 (*)     Creatinine 1.26 (*)     Alkaline Phosphatase 149 (*)     All other components within normal limits   CBC WITH DIFFERENTIAL - Abnormal; Notable for the following components:    WBC 14.31 (*)     Platelet 390 (*)     Neut % 85.9 (*)     Lymph % 8.2 (*)     Eos % 0.0 (*)     Neut # 12.28 (*)     Mono # 0.75 (*)     Eos # 0.00 (*)     IG# 0.08 (*)     IG% 0.6 (*)     All other components within normal limits   CBC W/ AUTO DIFFERENTIAL    Narrative:     The following orders were created for panel order CBC Auto Differential.  Procedure                               Abnormality         Status                     ---------                               -----------         ------                     CBC with Differential[[id number]]        Abnormal            Final result                 Please view results for these tests on the individual orders.          Imaging Results              X-Ray Chest AP Portable (Final result)  Result time 05/11/23 11:48:41      Final result by Ivania Guerrero III, MD (05/11/23 11:48:41)                   Impression:      1. A prominent (at least 12-13 cm) sliding-type hiatal hernia is present.  2. I see no lobar or segmental infiltrates or other significant  abnormalities.      Electronically signed by: Ivania Ordoñezcarlos  Date:    05/11/2023  Time:    11:48               Narrative:    EXAMINATION:  STUDY: XR CHEST AP PORTABLE    CLINICAL HISTORY AND TECHNIQUE:  Duong Campbell RT on 5/11/2023 11:37 AM    CLINICAL HX: ER PT    X  5 DAYS    C/O FLU, PNEUMONIA, DEHYDRATION, COUGHING UP YELLOW MUCUS, AND BODY ACHES    SENT BY MD    PAST MEDICAL HX: HIGH CHOLESTEROL, HTN, HYSTO    TECHNIQUE: 1V PORTABLE CHEST    TECH: NN    COMPARISON:  05/05/2023    FINDINGS:  A prominent (measuring at least 11-12 cm in greatest diameter) sliding-type hiatal hernia is present.The cardiac, hilar, and mediastinal contours appear unremarkable.I see no lobar or segmental infiltrates.No significant pleural effusions are noted.Mild-to-moderate degenerative changes are noted involving the thoracic spine.                                       Medications   sodium chloride 0.9% bolus 1,000 mL 1,000 mL (0 mLs Intravenous Stopped 5/11/23 1229)     Medical Decision Making:   Initial Assessment:   Cough, fatigue  Differential Diagnosis:   Pneumonia, dehydration, bronchitis  Clinical Tests:   Lab Tests: Ordered and Reviewed  Radiological Study: Ordered and Reviewed  ED Management:  Dust x-ray is clear, has a mild leukocytosis.  She is feeling better after an IV fluid bolus oxygen saying above 96% on room air.  She looks fine we will discharge to home.                        Clinical Impression:   Final diagnoses:  [R05.9] Cough  [J10.1] Influenza A (Primary)  [E86.0] Dehydration        ED Disposition Condition    Discharge Stable          ED Prescriptions    None       Follow-up Information       Follow up With Specialties Details Why Contact Info    Hua Emerson MD Family Medicine  If symptoms worsen [address] [id number]  [phone]               Mikhail Lal MD  05/11/23 6038

## 2023-05-19 DIAGNOSIS — M19.90 OSTEOARTHRITIS, UNSPECIFIED OSTEOARTHRITIS TYPE, UNSPECIFIED SITE: ICD-10-CM

## 2023-05-19 RX ORDER — HYDROCODONE BITARTRATE AND ACETAMINOPHEN 10; 325 MG/1; MG/1
1 TABLET ORAL EVERY 6 HOURS PRN
Qty: 30 TABLET | Refills: 0 | Status: SHIPPED | OUTPATIENT
Start: 2023-05-19 | End: 2023-06-19 | Stop reason: SDUPTHER

## 2023-06-19 DIAGNOSIS — G47.00 INSOMNIA, UNSPECIFIED TYPE: ICD-10-CM

## 2023-06-19 DIAGNOSIS — M19.90 OSTEOARTHRITIS, UNSPECIFIED OSTEOARTHRITIS TYPE, UNSPECIFIED SITE: ICD-10-CM

## 2023-06-19 RX ORDER — HYDROCODONE BITARTRATE AND ACETAMINOPHEN 10; 325 MG/1; MG/1
1 TABLET ORAL EVERY 6 HOURS PRN
Qty: 30 TABLET | Refills: 0 | Status: SHIPPED | OUTPATIENT
Start: 2023-06-19 | End: 2023-07-17 | Stop reason: SDUPTHER

## 2023-06-19 RX ORDER — ZOLPIDEM TARTRATE 10 MG/1
10 TABLET ORAL NIGHTLY PRN
Qty: 90 TABLET | Refills: 0 | Status: SHIPPED | OUTPATIENT
Start: 2023-06-19 | End: 2023-09-14 | Stop reason: SDUPTHER

## 2023-07-03 DIAGNOSIS — G89.4 CHRONIC PAIN SYNDROME: Primary | ICD-10-CM

## 2023-07-05 ENCOUNTER — TELEPHONE (OUTPATIENT)
Dept: FAMILY MEDICINE | Facility: CLINIC | Age: 68
End: 2023-07-05
Payer: MEDICARE

## 2023-07-05 RX ORDER — GABAPENTIN 600 MG/1
TABLET ORAL
Qty: 90 TABLET | Refills: 3 | Status: SHIPPED | OUTPATIENT
Start: 2023-07-05 | End: 2023-10-31 | Stop reason: SDUPTHER

## 2023-07-05 RX ORDER — LORAZEPAM 1 MG/1
TABLET ORAL
Qty: 60 TABLET | Refills: 3 | Status: SHIPPED | OUTPATIENT
Start: 2023-07-05

## 2023-07-10 ENCOUNTER — TELEPHONE (OUTPATIENT)
Dept: FAMILY MEDICINE | Facility: CLINIC | Age: 68
End: 2023-07-10
Payer: MEDICARE

## 2023-07-17 DIAGNOSIS — M19.90 OSTEOARTHRITIS, UNSPECIFIED OSTEOARTHRITIS TYPE, UNSPECIFIED SITE: ICD-10-CM

## 2023-07-17 DIAGNOSIS — T78.40XD ALLERGY, SUBSEQUENT ENCOUNTER: Primary | ICD-10-CM

## 2023-07-17 RX ORDER — MONTELUKAST SODIUM 10 MG/1
10 TABLET ORAL DAILY
Qty: 30 TABLET | Refills: 6 | Status: SHIPPED | OUTPATIENT
Start: 2023-07-17

## 2023-07-17 RX ORDER — HYDROCODONE BITARTRATE AND ACETAMINOPHEN 10; 325 MG/1; MG/1
1 TABLET ORAL EVERY 6 HOURS PRN
Qty: 30 TABLET | Refills: 0 | Status: SHIPPED | OUTPATIENT
Start: 2023-07-17 | End: 2023-08-16 | Stop reason: SDUPTHER

## 2023-07-26 ENCOUNTER — OFFICE VISIT (OUTPATIENT)
Dept: FAMILY MEDICINE | Facility: CLINIC | Age: 68
End: 2023-07-26
Payer: MEDICARE

## 2023-07-26 VITALS
OXYGEN SATURATION: 97 % | WEIGHT: 248.81 LBS | SYSTOLIC BLOOD PRESSURE: 114 MMHG | HEIGHT: 63 IN | BODY MASS INDEX: 44.09 KG/M2 | TEMPERATURE: 98 F | DIASTOLIC BLOOD PRESSURE: 72 MMHG | HEART RATE: 88 BPM

## 2023-07-26 DIAGNOSIS — E66.01 CLASS 3 SEVERE OBESITY DUE TO EXCESS CALORIES WITH SERIOUS COMORBIDITY AND BODY MASS INDEX (BMI) OF 40.0 TO 44.9 IN ADULT: ICD-10-CM

## 2023-07-26 DIAGNOSIS — F41.9 ANXIETY: ICD-10-CM

## 2023-07-26 DIAGNOSIS — E11.9 TYPE 2 DIABETES MELLITUS WITHOUT COMPLICATION, WITHOUT LONG-TERM CURRENT USE OF INSULIN: ICD-10-CM

## 2023-07-26 DIAGNOSIS — M19.90 OSTEOARTHRITIS, UNSPECIFIED OSTEOARTHRITIS TYPE, UNSPECIFIED SITE: ICD-10-CM

## 2023-07-26 DIAGNOSIS — K44.9 HIATAL HERNIA: ICD-10-CM

## 2023-07-26 DIAGNOSIS — Z12.31 BREAST CANCER SCREENING BY MAMMOGRAM: ICD-10-CM

## 2023-07-26 DIAGNOSIS — F32.A DEPRESSIVE DISORDER: ICD-10-CM

## 2023-07-26 DIAGNOSIS — E78.5 HYPERLIPIDEMIA, UNSPECIFIED HYPERLIPIDEMIA TYPE: ICD-10-CM

## 2023-07-26 DIAGNOSIS — M19.90 ARTHRITIS: ICD-10-CM

## 2023-07-26 DIAGNOSIS — I10 PRIMARY HYPERTENSION: ICD-10-CM

## 2023-07-26 DIAGNOSIS — G89.4 CHRONIC PAIN SYNDROME: Primary | ICD-10-CM

## 2023-07-26 LAB
C-REACTIVE PROTEIN(NORTH LA, ST. MARY, RP & JENNINGS): 1.8 MG/DL (ref 0–0.9)
ERYTHROCYTE [SEDIMENTATION RATE] IN BLOOD: 54 MM/HR (ref 0–20)
URATE SERPL-MCNC: 6.9 MG/DL (ref 2.6–7.2)

## 2023-07-26 PROCEDURE — 99214 PR OFFICE/OUTPT VISIT, EST, LEVL IV, 30-39 MIN: ICD-10-PCS | Mod: ,,, | Performed by: FAMILY MEDICINE

## 2023-07-26 PROCEDURE — 86003 ALLG SPEC IGE CRUDE XTRC EA: CPT | Performed by: FAMILY MEDICINE

## 2023-07-26 PROCEDURE — 84550 ASSAY OF BLOOD/URIC ACID: CPT | Performed by: FAMILY MEDICINE

## 2023-07-26 PROCEDURE — 86140 C-REACTIVE PROTEIN: CPT | Performed by: FAMILY MEDICINE

## 2023-07-26 PROCEDURE — 99214 OFFICE O/P EST MOD 30 MIN: CPT | Mod: ,,, | Performed by: FAMILY MEDICINE

## 2023-07-26 PROCEDURE — 85652 RBC SED RATE AUTOMATED: CPT | Performed by: FAMILY MEDICINE

## 2023-07-26 RX ORDER — LISINOPRIL 20 MG/1
20 TABLET ORAL DAILY
Qty: 90 TABLET | Refills: 3
Start: 2023-07-26 | End: 2024-03-07 | Stop reason: SDUPTHER

## 2023-07-26 RX ORDER — DICLOFENAC SODIUM 50 MG/1
50 TABLET, DELAYED RELEASE ORAL 2 TIMES DAILY
Qty: 60 TABLET | Refills: 2 | Status: SHIPPED | OUTPATIENT
Start: 2023-07-26 | End: 2023-10-24

## 2023-07-26 NOTE — PROGRESS NOTES
"SUBJECTIVE:  Swati Connor is a 67 y.o. female here for Follow-up (3 month follow up), Arthritis, and Dizziness      HPI  Patient is here for follow-up on chronic medical conditions.  She is been having a lot more arthralgias especially in her hands.  She feels like her hands are stiff in the morning and has trouble using them.  She is also having a lot of tailbone pain which is chronic for the patient.  Swati's allergies, medications, history, and problem list were updated as appropriate.    Review of Systems   See HPI    No results found for this or any previous visit (from the past 504 hour(s)).    OBJECTIVE:  Vital signs  Vitals:    07/26/23 1428   BP: 114/72   BP Location: Right arm   Patient Position: Sitting   BP Method: Large (Manual)   Pulse: 88   Temp: 97.8 °F (36.6 °C)   TempSrc: Oral   SpO2: 97%   Weight: 112.9 kg (248 lb 12.8 oz)   Height: 5' 2.99" (1.6 m)        Physical Exam hands have some mild synovitis of the MCP joints, patient has obesity    ASSESSMENT/PLAN:  1. Chronic pain syndrome  Continue hydrocodone daily as needed.  I talked to her about how the fact that chronic narcotics can increase your pain sensitivity for other pain.  She also wants to try something besides Celebrex and so we will try Voltaren  -     diclofenac (VOLTAREN) 50 MG EC tablet; Take 1 tablet (50 mg total) by mouth 2 (two) times daily.  Dispense: 60 tablet; Refill: 2    2. Depressive disorder  Stable on venlafaxine    3. Anxiety  She is on lorazepam on an as-needed basis    4. Hyperlipidemia, unspecified hyperlipidemia type  Stable on atorvastatin    5. Primary hypertension  Stable on lisinopril though she is been a little lightheaded.  I would like to decrease her lisinopril from 40 mg down to 20 mg daily  Overview:  Last Assessment & Plan:   Formatting of this note might be different from the original.  - takes lisinopril  - The current medical regimen is effective;  continue present plan and medications.      6. Type " 2 diabetes mellitus without complication, without long-term current use of insulin  Stable    7. Class 3 severe obesity due to excess calories with serious comorbidity and body mass index (BMI) of 40.0 to 44.9 in adult  Diet and exercise    8. Osteoarthritis, unspecified osteoarthritis type, unspecified site  Because of the arthritis in the hands I would like to check sed rate CRP and anti CCP antibody to look for signs of rheumatoid arthritis    9. Hiatal hernia  She has been having increased pain after eating and would like to consider surgery again.  I will refer her to General surgery  -     Ambulatory referral/consult to General Surgery; Future; Expected date: 08/02/2023    10. Arthritis    -     Uric Acid; Future; Expected date: 07/26/2023  -     Sedimentation rate; Future; Expected date: 07/26/2023  -     Cyclic Citrullinated Peptide Antibody, IgG; Future; Expected date: 07/26/2023  -     C-Reactive Protein; Future; Expected date: 07/26/2023    11. Breast cancer screening by mammogram    -     Mammo Digital Screening Bilat w/ Yadiel; Future; Expected date: 07/26/2023    Other orders  -     lisinopriL (PRINIVIL,ZESTRIL) 20 MG tablet; Take 1 tablet (20 mg total) by mouth once daily.  Dispense: 90 tablet; Refill: 3         Follow Up:  Follow up in about 3 months (around 10/26/2023).

## 2023-07-27 ENCOUNTER — TELEPHONE (OUTPATIENT)
Dept: FAMILY MEDICINE | Facility: CLINIC | Age: 68
End: 2023-07-27
Payer: MEDICARE

## 2023-07-27 DIAGNOSIS — G89.4 CHRONIC PAIN SYNDROME: Primary | ICD-10-CM

## 2023-07-27 PROCEDURE — 36415 COLL VENOUS BLD VENIPUNCTURE: CPT | Performed by: FAMILY MEDICINE

## 2023-07-27 NOTE — TELEPHONE ENCOUNTER
Spoke to lab and she asked me to add the orders to the chart and she would see what she could do.. orders added.

## 2023-07-27 NOTE — TELEPHONE ENCOUNTER
----- Message from Hua Emerson MD sent at 7/27/2023  8:01 AM CDT -----  Please ask lab to add a rheumatoid factor  ----- Message -----  From: Background User Lab  Sent: 7/26/2023   4:04 PM CDT  To: Hua Emerson MD

## 2023-07-30 LAB — CYCLIC CITRULLINATED PEPTIDE (CCP) (OHS): 0.8 U/ML

## 2023-08-01 ENCOUNTER — PATIENT MESSAGE (OUTPATIENT)
Dept: FAMILY MEDICINE | Facility: CLINIC | Age: 68
End: 2023-08-01
Payer: MEDICARE

## 2023-08-16 DIAGNOSIS — M19.90 OSTEOARTHRITIS, UNSPECIFIED OSTEOARTHRITIS TYPE, UNSPECIFIED SITE: ICD-10-CM

## 2023-08-16 RX ORDER — HYDROCODONE BITARTRATE AND ACETAMINOPHEN 10; 325 MG/1; MG/1
1 TABLET ORAL EVERY 6 HOURS PRN
Qty: 30 TABLET | Refills: 0 | Status: SHIPPED | OUTPATIENT
Start: 2023-08-16 | End: 2023-09-20 | Stop reason: SDUPTHER

## 2023-08-22 ENCOUNTER — HOSPITAL ENCOUNTER (OUTPATIENT)
Dept: RADIOLOGY | Facility: HOSPITAL | Age: 68
Discharge: HOME | End: 2023-08-22
Attending: FAMILY MEDICINE
Payer: MEDICARE

## 2023-08-22 VITALS — WEIGHT: 248 LBS | BODY MASS INDEX: 45.64 KG/M2 | HEIGHT: 62 IN

## 2023-08-22 DIAGNOSIS — Z12.31 BREAST CANCER SCREENING BY MAMMOGRAM: ICD-10-CM

## 2023-08-22 PROCEDURE — 77067 SCR MAMMO BI INCL CAD: CPT | Mod: TC

## 2023-09-14 DIAGNOSIS — G47.00 INSOMNIA, UNSPECIFIED TYPE: ICD-10-CM

## 2023-09-14 RX ORDER — ZOLPIDEM TARTRATE 10 MG/1
10 TABLET ORAL NIGHTLY PRN
Qty: 90 TABLET | Refills: 0 | Status: SHIPPED | OUTPATIENT
Start: 2023-09-14 | End: 2023-12-14

## 2023-09-20 DIAGNOSIS — M19.90 OSTEOARTHRITIS, UNSPECIFIED OSTEOARTHRITIS TYPE, UNSPECIFIED SITE: ICD-10-CM

## 2023-09-20 RX ORDER — HYDROCODONE BITARTRATE AND ACETAMINOPHEN 10; 325 MG/1; MG/1
1 TABLET ORAL EVERY 6 HOURS PRN
Qty: 30 TABLET | Refills: 0 | Status: SHIPPED | OUTPATIENT
Start: 2023-09-20 | End: 2023-10-20 | Stop reason: SDUPTHER

## 2023-10-02 ENCOUNTER — TELEPHONE (OUTPATIENT)
Dept: FAMILY MEDICINE | Facility: CLINIC | Age: 68
End: 2023-10-02
Payer: MEDICARE

## 2023-10-02 DIAGNOSIS — F41.9 ANXIETY: Primary | ICD-10-CM

## 2023-10-03 RX ORDER — CLONAZEPAM 1 MG/1
1 TABLET ORAL NIGHTLY
Qty: 30 TABLET | Refills: 0 | Status: SHIPPED | OUTPATIENT
Start: 2023-10-03 | End: 2023-10-31 | Stop reason: SDUPTHER

## 2023-10-12 ENCOUNTER — TELEPHONE (OUTPATIENT)
Dept: FAMILY MEDICINE | Facility: CLINIC | Age: 68
End: 2023-10-12
Payer: MEDICARE

## 2023-10-12 DIAGNOSIS — I10 PRIMARY HYPERTENSION: Primary | ICD-10-CM

## 2023-10-12 DIAGNOSIS — E11.9 TYPE 2 DIABETES MELLITUS WITHOUT COMPLICATION, WITHOUT LONG-TERM CURRENT USE OF INSULIN: ICD-10-CM

## 2023-10-12 DIAGNOSIS — E78.5 HYPERLIPIDEMIA, UNSPECIFIED HYPERLIPIDEMIA TYPE: ICD-10-CM

## 2023-10-12 DIAGNOSIS — D64.9 ANEMIA, UNSPECIFIED TYPE: ICD-10-CM

## 2023-10-16 PROCEDURE — 84443 ASSAY THYROID STIM HORMONE: CPT | Performed by: FAMILY MEDICINE

## 2023-10-16 PROCEDURE — 83036 HEMOGLOBIN GLYCOSYLATED A1C: CPT | Performed by: FAMILY MEDICINE

## 2023-10-16 PROCEDURE — 80053 COMPREHEN METABOLIC PANEL: CPT | Performed by: FAMILY MEDICINE

## 2023-10-16 PROCEDURE — 85025 COMPLETE CBC W/AUTO DIFF WBC: CPT | Performed by: FAMILY MEDICINE

## 2023-10-16 PROCEDURE — 80061 LIPID PANEL: CPT | Performed by: FAMILY MEDICINE

## 2023-10-20 DIAGNOSIS — M19.90 OSTEOARTHRITIS, UNSPECIFIED OSTEOARTHRITIS TYPE, UNSPECIFIED SITE: ICD-10-CM

## 2023-10-20 RX ORDER — HYDROCODONE BITARTRATE AND ACETAMINOPHEN 10; 325 MG/1; MG/1
1 TABLET ORAL EVERY 6 HOURS PRN
Qty: 30 TABLET | Refills: 0 | Status: SHIPPED | OUTPATIENT
Start: 2023-10-20 | End: 2023-11-20 | Stop reason: SDUPTHER

## 2023-10-24 ENCOUNTER — OFFICE VISIT (OUTPATIENT)
Dept: FAMILY MEDICINE | Facility: CLINIC | Age: 68
End: 2023-10-24
Payer: MEDICARE

## 2023-10-24 VITALS
HEIGHT: 62 IN | SYSTOLIC BLOOD PRESSURE: 118 MMHG | BODY MASS INDEX: 47.62 KG/M2 | OXYGEN SATURATION: 98 % | DIASTOLIC BLOOD PRESSURE: 68 MMHG | HEART RATE: 63 BPM | WEIGHT: 258.81 LBS | TEMPERATURE: 99 F

## 2023-10-24 DIAGNOSIS — M35.3 PMR (POLYMYALGIA RHEUMATICA): ICD-10-CM

## 2023-10-24 DIAGNOSIS — G89.4 CHRONIC PAIN SYNDROME: ICD-10-CM

## 2023-10-24 DIAGNOSIS — F33.9 RECURRENT DEPRESSION: Primary | ICD-10-CM

## 2023-10-24 DIAGNOSIS — E78.5 HYPERLIPIDEMIA, UNSPECIFIED HYPERLIPIDEMIA TYPE: ICD-10-CM

## 2023-10-24 DIAGNOSIS — E11.9 TYPE 2 DIABETES MELLITUS WITHOUT COMPLICATION, WITHOUT LONG-TERM CURRENT USE OF INSULIN: ICD-10-CM

## 2023-10-24 DIAGNOSIS — M19.90 OSTEOARTHRITIS, UNSPECIFIED OSTEOARTHRITIS TYPE, UNSPECIFIED SITE: ICD-10-CM

## 2023-10-24 DIAGNOSIS — D64.9 ANEMIA, UNSPECIFIED TYPE: ICD-10-CM

## 2023-10-24 DIAGNOSIS — E66.01 CLASS 3 SEVERE OBESITY DUE TO EXCESS CALORIES WITH SERIOUS COMORBIDITY AND BODY MASS INDEX (BMI) OF 40.0 TO 44.9 IN ADULT: ICD-10-CM

## 2023-10-24 PROCEDURE — 99214 PR OFFICE/OUTPT VISIT, EST, LEVL IV, 30-39 MIN: ICD-10-PCS | Mod: ,,, | Performed by: FAMILY MEDICINE

## 2023-10-24 PROCEDURE — 99214 OFFICE O/P EST MOD 30 MIN: CPT | Mod: ,,, | Performed by: FAMILY MEDICINE

## 2023-10-24 RX ORDER — PREDNISOLONE ACETATE 10 MG/ML
SUSPENSION/ DROPS OPHTHALMIC
COMMUNITY
Start: 2023-10-11 | End: 2024-03-07

## 2023-10-24 RX ORDER — VENLAFAXINE HYDROCHLORIDE 150 MG/1
150 CAPSULE, EXTENDED RELEASE ORAL 2 TIMES DAILY
Qty: 180 CAPSULE | Refills: 3 | Status: SHIPPED | OUTPATIENT
Start: 2023-10-24 | End: 2024-10-23

## 2023-10-24 RX ORDER — LIFITEGRAST 50 MG/ML
1 SOLUTION/ DROPS OPHTHALMIC 2 TIMES DAILY
COMMUNITY
Start: 2023-10-11 | End: 2024-03-07

## 2023-10-24 RX ORDER — PREDNISONE 20 MG/1
40 TABLET ORAL DAILY
Qty: 20 TABLET | Refills: 0 | Status: SHIPPED | OUTPATIENT
Start: 2023-10-24 | End: 2023-10-31

## 2023-10-24 NOTE — PROGRESS NOTES
SUBJECTIVE:  Swati Connor is a 67 y.o. female here for Follow-up (3mth f/u )      HPI  Patient is here for follow-up on chronic conditions.  See assessment and plan for individual list of issues.  She is still having a lot of myalgias which she is had for months.  Her CRP level and sed rate were both a little elevated.  The sed rate was 54.  Enids allergies, medications, history, and problem list were updated as appropriate.    Review of Systems   See HPI.    Recent Results (from the past 504 hour(s))   Comprehensive Metabolic Panel    Collection Time: 10/16/23  9:34 AM   Result Value Ref Range    Sodium Level 142 135 - 145 mmol/L    Potassium Level 4.7 3.5 - 5.1 mmol/L    Chloride 107 98 - 110 mmol/L    Carbon Dioxide 26 21 - 32 mmol/L    Glucose Level 109 70 - 115 mg/dL    Blood Urea Nitrogen 15.0 7.0 - 20.0 mg/dL    Creatinine 0.79 0.66 - 1.25 mg/dL    Calcium Level Total 9.6 8.4 - 10.2 mg/dL    Protein Total 7.0 6.3 - 8.2 gm/dL    Albumin Level 3.9 3.4 - 5.0 g/dL    Globulin 3.1 2.0 - 3.9 gm/dL    Albumin/Globulin Ratio 1.3 ratio    Bilirubin Total 0.5 0.0 - 1.0 mg/dL    Alkaline Phosphatase 135 50 - 144 unit/L    Alanine Aminotransferase 17 1 - 45 unit/L    Aspartate Aminotransferase 32 14 - 36 unit/L    eGFR 82 mls/min/1.73/m2    Anion Gap 9.0 2.0 - 13.0 mEq/L    BUN/Creatinine Ratio 19 12 - 20   TSH    Collection Time: 10/16/23  9:34 AM   Result Value Ref Range    TSH 4.160 (H) 0.360 - 3.740 uIU/mL   Hemoglobin A1C    Collection Time: 10/16/23  9:34 AM   Result Value Ref Range    Hemoglobin A1c 5.8 4.0 - 6.0 %    Estimated Average Glucose 119.8 (H) 70.0 - 115.0 mg/dL   Lipid Panel    Collection Time: 10/16/23  9:34 AM   Result Value Ref Range    Cholesterol Total 223 (H) 0 - 200 mg/dL    HDL Cholesterol 43 40 - 60 mg/dL    Triglyceride 240 (H) 30 - 200 mg/dL    LDL Cholesterol Direct 118.0 (H) 30.0 - 100.0 mg/dL   CBC with Differential    Collection Time: 10/16/23  9:34 AM   Result Value Ref Range     "WBC 8.66 4.00 - 11.50 x10(3)/mcL    RBC 4.58 4.00 - 5.10 x10(6)/mcL    Hgb 12.1 11.8 - 16.0 g/dL    Hct 38.7 36.0 - 48.0 %    MCV 84.5 79.0 - 99.0 fL    MCH 26.4 (L) 27.0 - 34.0 pg    MCHC 31.3 31.0 - 37.0 g/dL    RDW 14.7 (H) 11.0 - 14.5 %    Platelet 369 140 - 371 x10(3)/mcL    MPV 11.6 9.4 - 12.4 fL    Neut % 72.0 37 - 73 %    Lymph % 21.6 20 - 55 %    Mono % 5.8 4.7 - 12.5 %    Eos % 0.0 (L) 0.7 - 7 %    Basophil % 0.1 0.1 - 1.2 %    Lymph # 1.87 1.16 - 3.74 x10(3)/mcL    Neut # 6.24 (H) 1.56 - 6.13 x10(3)/mcL    Mono # 0.50 (H) 0.24 - 0.36 x10(3)/mcL    Eos # 0.00 (L) 0.04 - 0.36 x10(3)/mcL    Baso # 0.01 0.01 - 0.08 x10(3)/mcL    IG# 0.04 (H) 0.0001 - 0.031 x10(3)/mcL    IG% 0.5 0 - 0.5 %    NRBC% 0.0 <=1 %       OBJECTIVE:  Vital signs  Vitals:    10/24/23 1314   BP: 118/68   BP Location: Right arm   Pulse: 63   Temp: 98.6 °F (37 °C)   TempSrc: Oral   SpO2: 98%   Weight: 117.4 kg (258 lb 12.8 oz)   Height: 5' 2" (1.575 m)        Physical Exam she is proximal muscle tenderness    ASSESSMENT/PLAN:  1. Recurrent depression  Refill venlafaxine    2. Chronic pain syndrome      3. Hyperlipidemia, unspecified hyperlipidemia type  On atorvastatin,   Overview:  Lab Results   Component Value Date    CHOL 223 (H) 10/16/2023    HDL 43 10/16/2023    TRIG 240 (H) 10/16/2023    DLDL 118.0 (H) 10/16/2023    DLDL 82.8 04/17/2023       Assessment & Plan:  On atorvastatin 40 mg daily      4. Anemia, unspecified type  Stable  Overview:  Lab Results   Component Value Date    HGB 12.1 10/16/2023    HGB 13.1 05/11/2023    HGB 12.6 04/17/2023         5. Type 2 diabetes mellitus without complication, without long-term current use of insulin  Doing okay without medicine at this time  Overview:  Lab Results   Component Value Date    HGBA1C 5.8 10/16/2023    HGBA1C 5.3 04/17/2023    HGBA1C 5.1 03/21/2022          6. Class 3 severe obesity due to excess calories with serious comorbidity and body mass index (BMI) of 40.0 to 44.9 in " adult      7. Osteoarthritis, unspecified osteoarthritis type, unspecified site      8. PMR (polymyalgia rheumatica)  Symptoms may be polymyalgia rheumatica.  I would like to try her on prednisone 40 mg a day for 10 days in the neck as this is effective at decreasing her muscle pain symptoms we can slowly taper this down over 6 months    Other orders  -     venlafaxine (EFFEXOR-XR) 150 MG Cp24; Take 1 capsule (150 mg total) by mouth 2 (two) times daily.  Dispense: 180 capsule; Refill: 3  -     predniSONE (DELTASONE) 20 MG tablet; Take 2 tablets (40 mg total) by mouth once daily. for 10 days  Dispense: 20 tablet; Refill: 0         Follow Up:  Follow up in about 1 week (around 10/31/2023).

## 2023-10-31 ENCOUNTER — OFFICE VISIT (OUTPATIENT)
Dept: FAMILY MEDICINE | Facility: CLINIC | Age: 68
End: 2023-10-31
Payer: MEDICARE

## 2023-10-31 ENCOUNTER — TELEPHONE (OUTPATIENT)
Dept: FAMILY MEDICINE | Facility: CLINIC | Age: 68
End: 2023-10-31

## 2023-10-31 VITALS
TEMPERATURE: 99 F | OXYGEN SATURATION: 97 % | SYSTOLIC BLOOD PRESSURE: 128 MMHG | DIASTOLIC BLOOD PRESSURE: 72 MMHG | HEIGHT: 62 IN | BODY MASS INDEX: 47.99 KG/M2 | WEIGHT: 260.81 LBS | HEART RATE: 75 BPM

## 2023-10-31 DIAGNOSIS — G89.4 CHRONIC PAIN SYNDROME: ICD-10-CM

## 2023-10-31 DIAGNOSIS — F41.9 ANXIETY: ICD-10-CM

## 2023-10-31 DIAGNOSIS — M35.3 POLYMYALGIA RHEUMATICA: ICD-10-CM

## 2023-10-31 PROCEDURE — 99213 OFFICE O/P EST LOW 20 MIN: CPT | Mod: ,,, | Performed by: FAMILY MEDICINE

## 2023-10-31 PROCEDURE — 99213 PR OFFICE/OUTPT VISIT, EST, LEVL III, 20-29 MIN: ICD-10-PCS | Mod: ,,, | Performed by: FAMILY MEDICINE

## 2023-10-31 RX ORDER — CLONAZEPAM 1 MG/1
1 TABLET ORAL NIGHTLY
Qty: 30 TABLET | Refills: 2 | Status: SHIPPED | OUTPATIENT
Start: 2023-10-31 | End: 2023-11-13 | Stop reason: SDUPTHER

## 2023-10-31 RX ORDER — PREDNISONE 20 MG/1
20 TABLET ORAL DAILY
Qty: 14 TABLET | Refills: 0 | Status: SHIPPED | OUTPATIENT
Start: 2023-10-31 | End: 2023-11-13

## 2023-10-31 RX ORDER — GABAPENTIN 600 MG/1
TABLET ORAL
Qty: 90 TABLET | Refills: 3 | Status: SHIPPED | OUTPATIENT
Start: 2023-10-31 | End: 2024-03-08

## 2023-10-31 RX ORDER — NITROFURANTOIN 25; 75 MG/1; MG/1
100 CAPSULE ORAL 2 TIMES DAILY
Qty: 14 CAPSULE | Refills: 0 | Status: SHIPPED | OUTPATIENT
Start: 2023-10-31 | End: 2023-11-07

## 2023-10-31 NOTE — PROGRESS NOTES
SUBJECTIVE:  Swati Connor is a 67 y.o. female here for Follow-up      HPI  Patient here for follow-up on suspected polymyalgia rheumatica.  Since starting prednisone 40 mg daily her myalgias symptoms have completely resolved.  She is feeling much better.  Enids allergies, medications, history, and problem list were updated as appropriate.    Review of Systems   See HPI  Recent Results (from the past 504 hour(s))   Comprehensive Metabolic Panel    Collection Time: 10/16/23  9:34 AM   Result Value Ref Range    Sodium Level 142 135 - 145 mmol/L    Potassium Level 4.7 3.5 - 5.1 mmol/L    Chloride 107 98 - 110 mmol/L    Carbon Dioxide 26 21 - 32 mmol/L    Glucose Level 109 70 - 115 mg/dL    Blood Urea Nitrogen 15.0 7.0 - 20.0 mg/dL    Creatinine 0.79 0.66 - 1.25 mg/dL    Calcium Level Total 9.6 8.4 - 10.2 mg/dL    Protein Total 7.0 6.3 - 8.2 gm/dL    Albumin Level 3.9 3.4 - 5.0 g/dL    Globulin 3.1 2.0 - 3.9 gm/dL    Albumin/Globulin Ratio 1.3 ratio    Bilirubin Total 0.5 0.0 - 1.0 mg/dL    Alkaline Phosphatase 135 50 - 144 unit/L    Alanine Aminotransferase 17 1 - 45 unit/L    Aspartate Aminotransferase 32 14 - 36 unit/L    eGFR 82 mls/min/1.73/m2    Anion Gap 9.0 2.0 - 13.0 mEq/L    BUN/Creatinine Ratio 19 12 - 20   TSH    Collection Time: 10/16/23  9:34 AM   Result Value Ref Range    TSH 4.160 (H) 0.360 - 3.740 uIU/mL   Hemoglobin A1C    Collection Time: 10/16/23  9:34 AM   Result Value Ref Range    Hemoglobin A1c 5.8 4.0 - 6.0 %    Estimated Average Glucose 119.8 (H) 70.0 - 115.0 mg/dL   Lipid Panel    Collection Time: 10/16/23  9:34 AM   Result Value Ref Range    Cholesterol Total 223 (H) 0 - 200 mg/dL    HDL Cholesterol 43 40 - 60 mg/dL    Triglyceride 240 (H) 30 - 200 mg/dL    LDL Cholesterol Direct 118.0 (H) 30.0 - 100.0 mg/dL   CBC with Differential    Collection Time: 10/16/23  9:34 AM   Result Value Ref Range    WBC 8.66 4.00 - 11.50 x10(3)/mcL    RBC 4.58 4.00 - 5.10 x10(6)/mcL    Hgb 12.1 11.8 - 16.0  "g/dL    Hct 38.7 36.0 - 48.0 %    MCV 84.5 79.0 - 99.0 fL    MCH 26.4 (L) 27.0 - 34.0 pg    MCHC 31.3 31.0 - 37.0 g/dL    RDW 14.7 (H) 11.0 - 14.5 %    Platelet 369 140 - 371 x10(3)/mcL    MPV 11.6 9.4 - 12.4 fL    Neut % 72.0 37 - 73 %    Lymph % 21.6 20 - 55 %    Mono % 5.8 4.7 - 12.5 %    Eos % 0.0 (L) 0.7 - 7 %    Basophil % 0.1 0.1 - 1.2 %    Lymph # 1.87 1.16 - 3.74 x10(3)/mcL    Neut # 6.24 (H) 1.56 - 6.13 x10(3)/mcL    Mono # 0.50 (H) 0.24 - 0.36 x10(3)/mcL    Eos # 0.00 (L) 0.04 - 0.36 x10(3)/mcL    Baso # 0.01 0.01 - 0.08 x10(3)/mcL    IG# 0.04 (H) 0.0001 - 0.031 x10(3)/mcL    IG% 0.5 0 - 0.5 %    NRBC% 0.0 <=1 %       OBJECTIVE:  Vital signs  Vitals:    10/31/23 0955   BP: 128/72   BP Location: Left arm   Patient Position: Sitting   BP Method: Large (Manual)   Pulse: 75   Temp: 98.6 °F (37 °C)   TempSrc: Oral   SpO2: 97%   Weight: 118.3 kg (260 lb 12.8 oz)   Height: 5' 2.01" (1.575 m)        Physical Exam     ASSESSMENT/PLAN:  1. Anxiety  Refilled clonazepam today  -     clonazePAM (KLONOPIN) 1 MG tablet; Take 1 tablet (1 mg total) by mouth every evening.  Dispense: 30 tablet; Refill: 2    2. Chronic pain syndrome  Refilled gabapentin today  -     gabapentin (NEURONTIN) 600 MG tablet; TAKE ONE TABLET BY MOUTH THREE TIMES A DAY FOR NEURALGIC PAIN  Dispense: 90 tablet; Refill: 3  3. Polymyalgia rheumatica  - based on elevated sed rate CRP and symptom relief with prednisone I believe this is her diagnosis.  We will decrease prednisone to 20 mg daily for the next 2 weeks and then see how she is doing with the hope of decreasing to 10 mg daily at that time.  Other orders  -     predniSONE (DELTASONE) 20 MG tablet; Take 1 tablet (20 mg total) by mouth once daily. for 14 days  Dispense: 14 tablet; Refill: 0         Follow Up:  Follow up in about 2 weeks (around 11/14/2023).            "

## 2023-11-13 ENCOUNTER — OFFICE VISIT (OUTPATIENT)
Dept: FAMILY MEDICINE | Facility: CLINIC | Age: 68
End: 2023-11-13
Payer: MEDICARE

## 2023-11-13 VITALS
SYSTOLIC BLOOD PRESSURE: 137 MMHG | BODY MASS INDEX: 47.48 KG/M2 | WEIGHT: 258 LBS | OXYGEN SATURATION: 98 % | DIASTOLIC BLOOD PRESSURE: 89 MMHG | HEART RATE: 84 BPM | HEIGHT: 62 IN

## 2023-11-13 DIAGNOSIS — F41.9 ANXIETY: ICD-10-CM

## 2023-11-13 DIAGNOSIS — M35.3 PMR (POLYMYALGIA RHEUMATICA): Primary | ICD-10-CM

## 2023-11-13 PROCEDURE — 99214 OFFICE O/P EST MOD 30 MIN: CPT | Mod: ,,, | Performed by: FAMILY MEDICINE

## 2023-11-13 PROCEDURE — 99214 PR OFFICE/OUTPT VISIT, EST, LEVL IV, 30-39 MIN: ICD-10-PCS | Mod: ,,, | Performed by: FAMILY MEDICINE

## 2023-11-13 RX ORDER — PREDNISONE 10 MG/1
10 TABLET ORAL DAILY
Qty: 30 TABLET | Refills: 2 | Status: SHIPPED | OUTPATIENT
Start: 2023-11-13 | End: 2024-03-07

## 2023-11-13 RX ORDER — CLONAZEPAM 1 MG/1
1 TABLET ORAL 2 TIMES DAILY
Qty: 60 TABLET | Refills: 2 | Status: SHIPPED | OUTPATIENT
Start: 2023-11-13 | End: 2024-02-09 | Stop reason: SDUPTHER

## 2023-11-13 RX ORDER — SEMAGLUTIDE 0.68 MG/ML
0.5 INJECTION, SOLUTION SUBCUTANEOUS
Qty: 3 ML | Refills: 2 | Status: SHIPPED | OUTPATIENT
Start: 2023-11-13 | End: 2024-02-11

## 2023-11-13 NOTE — PROGRESS NOTES
"SUBJECTIVE:  Swati Connor is a 67 y.o. female here for Follow-up (Chronic conditions) and Ear Fullness      HPI  Patient is here for follow-up on polymyalgia rheumatica.  She also needs to adjust her clonazepam.  She has been taking it twice a day in the last prescription was only for once a day.  She takes this for anxiety.  She also has chronic low back pain that she is had for years and had questions about what she could do for this.  She did have a fall outside of the pharmacy the other day which has kind of aggravated the back  Enids allergies, medications, history, and problem list were updated as appropriate.    Review of Systems   See HPI    No results found for this or any previous visit (from the past 504 hour(s)).    OBJECTIVE:  Vital signs  Vitals:    11/13/23 1540 11/13/23 1545   BP: (!) 153/86 137/89   BP Location: Right arm Right arm   Patient Position: Sitting Sitting   Pulse: 84    SpO2: 98%    Weight: 117 kg (258 lb)    Height: 5' 2.01" (1.575 m)         Physical Exam obese patient.  She walks without any difficulty    ASSESSMENT/PLAN:  1. PMR (polymyalgia rheumatica)  Since reducing prednisone to 20 mg daily she is not had any increase in her pain.  We will decrease dose to 10 mg a day which she will need to be on probably for the next 3-6 months.  We will re-evaluate in 2 months at the 10 mg dose    2. Anxiety  Okay to increase clonazepam  To 1 mg twice a day    3. Chronic low back pain - probably degenerative disc issues.  Offered physical therapy but she declined at this time    4. Obesity and diabetes - she took Ozempic in the past and would like to get back on this  -     clonazePAM (KLONOPIN) 1 MG tablet; Take 1 tablet (1 mg total) by mouth 2 (two) times daily.  Dispense: 60 tablet; Refill: 2    Other orders  -     predniSONE (DELTASONE) 10 MG tablet; Take 1 tablet (10 mg total) by mouth once daily.  Dispense: 30 tablet; Refill: 2  -     semaglutide (OZEMPIC) 0.25 mg or 0.5 mg (2 mg/3 " mL) pen injector; Inject 0.5 mg into the skin every 7 days.  Dispense: 3 mL; Refill: 2         Follow Up:  Follow up in about 2 months (around 1/13/2024).

## 2023-11-20 DIAGNOSIS — M19.90 OSTEOARTHRITIS, UNSPECIFIED OSTEOARTHRITIS TYPE, UNSPECIFIED SITE: ICD-10-CM

## 2023-11-20 RX ORDER — HYDROCODONE BITARTRATE AND ACETAMINOPHEN 10; 325 MG/1; MG/1
1 TABLET ORAL EVERY 6 HOURS PRN
Qty: 30 TABLET | Refills: 0 | Status: SHIPPED | OUTPATIENT
Start: 2023-11-20 | End: 2023-12-22

## 2023-11-23 ENCOUNTER — HOSPITAL ENCOUNTER (EMERGENCY)
Facility: HOSPITAL | Age: 68
Discharge: HOME | End: 2023-11-23
Attending: FAMILY MEDICINE
Payer: MEDICARE

## 2023-11-23 VITALS
HEIGHT: 63 IN | HEART RATE: 71 BPM | SYSTOLIC BLOOD PRESSURE: 158 MMHG | OXYGEN SATURATION: 99 % | BODY MASS INDEX: 46 KG/M2 | RESPIRATION RATE: 18 BRPM | WEIGHT: 259.63 LBS | TEMPERATURE: 98 F | DIASTOLIC BLOOD PRESSURE: 97 MMHG

## 2023-11-23 DIAGNOSIS — R10.13 EPIGASTRIC PAIN: Primary | ICD-10-CM

## 2023-11-23 DIAGNOSIS — R07.9 CHEST PAIN: ICD-10-CM

## 2023-11-23 LAB
ALBUMIN SERPL-MCNC: 4.2 G/DL (ref 3.4–5)
ALBUMIN/GLOB SERPL: 1.2 RATIO
ALP SERPL-CCNC: 140 UNIT/L (ref 50–144)
ALT SERPL-CCNC: 19 UNIT/L (ref 1–45)
ANION GAP SERPL CALC-SCNC: 11 MEQ/L (ref 2–13)
AST SERPL-CCNC: 27 UNIT/L (ref 14–36)
BASOPHILS # BLD AUTO: 0.01 X10(3)/MCL (ref 0.01–0.08)
BASOPHILS NFR BLD AUTO: 0.1 % (ref 0.1–1.2)
BILIRUB SERPL-MCNC: 0.4 MG/DL (ref 0–1)
BUN SERPL-MCNC: 20 MG/DL (ref 7–20)
CALCIUM SERPL-MCNC: 9.3 MG/DL (ref 8.4–10.2)
CHLORIDE SERPL-SCNC: 109 MMOL/L (ref 98–110)
CK MB SERPL-MCNC: <0.22 NG/ML (ref 0–3.38)
CK SERPL-CCNC: 35 U/L (ref 30–135)
CO2 SERPL-SCNC: 21 MMOL/L (ref 21–32)
CREAT SERPL-MCNC: 1.15 MG/DL (ref 0.66–1.25)
CREAT/UREA NIT SERPL: 17 (ref 12–20)
EOSINOPHIL # BLD AUTO: 0 X10(3)/MCL (ref 0.04–0.36)
EOSINOPHIL NFR BLD AUTO: 0 % (ref 0.7–7)
ERYTHROCYTE [DISTWIDTH] IN BLOOD BY AUTOMATED COUNT: 15.4 % (ref 11–14.5)
GFR SERPLBLD CREATININE-BSD FMLA CKD-EPI: 52 MLS/MIN/1.73/M2
GLOBULIN SER-MCNC: 3.5 GM/DL (ref 2–3.9)
GLUCOSE SERPL-MCNC: 122 MG/DL (ref 70–115)
HCT VFR BLD AUTO: 39.2 % (ref 36–48)
HGB BLD-MCNC: 12.4 G/DL (ref 11.8–16)
IMM GRANULOCYTES # BLD AUTO: 0.02 X10(3)/MCL (ref 0–0.03)
IMM GRANULOCYTES NFR BLD AUTO: 0.2 % (ref 0–0.5)
LIPASE SERPL-CCNC: 253 U/L (ref 23–300)
LYMPHOCYTES # BLD AUTO: 2.54 X10(3)/MCL (ref 1.16–3.74)
LYMPHOCYTES NFR BLD AUTO: 28.7 % (ref 20–55)
MCH RBC QN AUTO: 27.3 PG (ref 27–34)
MCHC RBC AUTO-ENTMCNC: 31.6 G/DL (ref 31–37)
MCV RBC AUTO: 86.3 FL (ref 79–99)
MONOCYTES # BLD AUTO: 0.6 X10(3)/MCL (ref 0.24–0.36)
MONOCYTES NFR BLD AUTO: 6.8 % (ref 4.7–12.5)
NEUTROPHILS # BLD AUTO: 5.69 X10(3)/MCL (ref 1.56–6.13)
NEUTROPHILS NFR BLD AUTO: 64.2 % (ref 37–73)
NRBC BLD AUTO-RTO: 0 %
PLATELET # BLD AUTO: 348 X10(3)/MCL (ref 140–371)
PMV BLD AUTO: 11.4 FL (ref 9.4–12.4)
POTASSIUM SERPL-SCNC: 3.8 MMOL/L (ref 3.5–5.1)
PROT SERPL-MCNC: 7.7 GM/DL (ref 6.3–8.2)
RBC # BLD AUTO: 4.54 X10(6)/MCL (ref 4–5.1)
SODIUM SERPL-SCNC: 141 MMOL/L (ref 135–145)
TROPONIN I SERPL-MCNC: <0.012 NG/ML (ref 0–0.03)
WBC # SPEC AUTO: 8.86 X10(3)/MCL (ref 4–11.5)

## 2023-11-23 PROCEDURE — 80053 COMPREHEN METABOLIC PANEL: CPT | Performed by: FAMILY MEDICINE

## 2023-11-23 PROCEDURE — C9113 INJ PANTOPRAZOLE SODIUM, VIA: HCPCS | Performed by: FAMILY MEDICINE

## 2023-11-23 PROCEDURE — 25000003 PHARM REV CODE 250: Performed by: FAMILY MEDICINE

## 2023-11-23 PROCEDURE — 82550 ASSAY OF CK (CPK): CPT | Performed by: FAMILY MEDICINE

## 2023-11-23 PROCEDURE — 63600175 PHARM REV CODE 636 W HCPCS: Performed by: FAMILY MEDICINE

## 2023-11-23 PROCEDURE — 96375 TX/PRO/DX INJ NEW DRUG ADDON: CPT

## 2023-11-23 PROCEDURE — 99285 EMERGENCY DEPT VISIT HI MDM: CPT | Mod: 25

## 2023-11-23 PROCEDURE — 84484 ASSAY OF TROPONIN QUANT: CPT | Performed by: FAMILY MEDICINE

## 2023-11-23 PROCEDURE — 82553 CREATINE MB FRACTION: CPT | Performed by: FAMILY MEDICINE

## 2023-11-23 PROCEDURE — 85025 COMPLETE CBC W/AUTO DIFF WBC: CPT | Performed by: FAMILY MEDICINE

## 2023-11-23 PROCEDURE — 96374 THER/PROPH/DIAG INJ IV PUSH: CPT

## 2023-11-23 PROCEDURE — 83690 ASSAY OF LIPASE: CPT | Performed by: FAMILY MEDICINE

## 2023-11-23 RX ORDER — KETOROLAC TROMETHAMINE 30 MG/ML
15 INJECTION, SOLUTION INTRAMUSCULAR; INTRAVENOUS
Status: COMPLETED | OUTPATIENT
Start: 2023-11-23 | End: 2023-11-23

## 2023-11-23 RX ORDER — HYDROCODONE BITARTRATE AND ACETAMINOPHEN 10; 325 MG/1; MG/1
1 TABLET ORAL
Status: COMPLETED | OUTPATIENT
Start: 2023-11-23 | End: 2023-11-23

## 2023-11-23 RX ORDER — PROCHLORPERAZINE EDISYLATE 5 MG/ML
10 INJECTION INTRAMUSCULAR; INTRAVENOUS
Status: COMPLETED | OUTPATIENT
Start: 2023-11-23 | End: 2023-11-23

## 2023-11-23 RX ORDER — LORAZEPAM 2 MG/ML
1 INJECTION INTRAMUSCULAR
Status: COMPLETED | OUTPATIENT
Start: 2023-11-23 | End: 2023-11-23

## 2023-11-23 RX ORDER — PROMETHAZINE HYDROCHLORIDE 25 MG/1
25 TABLET ORAL EVERY 6 HOURS PRN
Qty: 15 TABLET | Refills: 0 | Status: SHIPPED | OUTPATIENT
Start: 2023-11-23

## 2023-11-23 RX ORDER — PANTOPRAZOLE SODIUM 40 MG/10ML
40 INJECTION, POWDER, LYOPHILIZED, FOR SOLUTION INTRAVENOUS
Status: COMPLETED | OUTPATIENT
Start: 2023-11-23 | End: 2023-11-23

## 2023-11-23 RX ORDER — HYDROCODONE BITARTRATE AND ACETAMINOPHEN 10; 325 MG/1; MG/1
1 TABLET ORAL EVERY 6 HOURS PRN
Qty: 10 TABLET | Refills: 0 | Status: SHIPPED | OUTPATIENT
Start: 2023-11-23 | End: 2023-12-22 | Stop reason: SDUPTHER

## 2023-11-23 RX ADMIN — PROCHLORPERAZINE EDISYLATE 10 MG: 5 INJECTION INTRAMUSCULAR; INTRAVENOUS at 02:11

## 2023-11-23 RX ADMIN — KETOROLAC TROMETHAMINE 15 MG: 30 INJECTION, SOLUTION INTRAMUSCULAR at 02:11

## 2023-11-23 RX ADMIN — PANTOPRAZOLE SODIUM 40 MG: 40 INJECTION, POWDER, FOR SOLUTION INTRAVENOUS at 02:11

## 2023-11-23 RX ADMIN — HYDROCODONE BITARTRATE AND ACETAMINOPHEN 1 TABLET: 10; 325 TABLET ORAL at 03:11

## 2023-11-23 RX ADMIN — LORAZEPAM 1 MG: 2 INJECTION, SOLUTION INTRAMUSCULAR; INTRAVENOUS at 02:11

## 2023-11-23 NOTE — ED PROVIDER NOTES
Encounter Date: 11/23/2023       History     Chief Complaint   Patient presents with    Chest Pain    Abdominal Pain    Jaw Pain    Vomiting    Nausea     Pt c/o mid sub sternal CP that radiates up into jaw and down into upper middle abdominal area.  Pt also c/o accompanying nausea and vomiting onset 1 hour pta.       Patient presented to the emergency room with epigastric burning pain that has been going on for over a year.  Describes burning epigastric pain with nausea and occasional vomiting.  She was told that she had to get a esophageal procedure done awhile ago, but was put off because of COVID concerns.  The patient is under pain management services with Dr. Emerson for this.    The history is provided by the patient and the spouse.     Review of patient's allergies indicates:  No Known Allergies  Past Medical History:   Diagnosis Date    Depression     GERD (gastroesophageal reflux disease)     High cholesterol     Hypertension      Past Surgical History:   Procedure Laterality Date    BLADDER SUSPENSION      CARPAL TUNNEL RELEASE Bilateral     HYSTERECTOMY       Family History   Problem Relation Age of Onset    Breast cancer Mother     Pancreatic cancer Mother     Heart failure Father      Social History     Tobacco Use    Smoking status: Never    Smokeless tobacco: Never   Substance Use Topics    Alcohol use: Not Currently    Drug use: Never     Review of Systems   Constitutional:  Negative for fever.   HENT:  Negative for sore throat.    Respiratory:  Negative for shortness of breath.    Cardiovascular:  Negative for chest pain.   Gastrointestinal:  Positive for abdominal pain, nausea and vomiting.   Genitourinary:  Negative for dysuria.   Musculoskeletal:  Negative for back pain.   Skin:  Negative for rash.   Neurological:  Negative for weakness.   Hematological:  Does not bruise/bleed easily.   All other systems reviewed and are negative.      Physical Exam     Initial Vitals   BP Pulse Resp Temp SpO2    11/23/23 1349 11/23/23 1332 11/23/23 1332 11/23/23 1332 11/23/23 1332   (!) 179/153 61 (!) 24 98.4 °F (36.9 °C) 97 %      MAP       --                Physical Exam    Nursing note and vitals reviewed.  Constitutional:   Patient is anxious and worried pointing to her epigastrium.  She was alert and oriented x4   HENT:   Head: Normocephalic and atraumatic.   Eyes: EOM are normal. Pupils are equal, round, and reactive to light.   Neck: Neck supple.   Normal range of motion.  Cardiovascular:  Normal rate, regular rhythm and normal heart sounds.           Pulmonary/Chest: Breath sounds normal.   Abdominal: Abdomen is soft. Bowel sounds are normal. There is abdominal tenderness (mid epigastric tenderness, no r/g).   Musculoskeletal:         General: No edema. Normal range of motion.      Cervical back: Normal range of motion and neck supple.     Neurological: She is alert and oriented to person, place, and time.   Skin: Skin is warm and dry. Capillary refill takes less than 2 seconds.   Psychiatric: She has a normal mood and affect.         ED Course   Procedures  Labs Reviewed   COMPREHENSIVE METABOLIC PANEL - Abnormal; Notable for the following components:       Result Value    Glucose Level 122 (*)     All other components within normal limits   CBC WITH DIFFERENTIAL - Abnormal; Notable for the following components:    RDW 15.4 (*)     Eos % 0.0 (*)     Mono # 0.60 (*)     Eos # 0.00 (*)     All other components within normal limits   LIPASE - Normal   TROPONIN I - Normal   CK - Normal   CK-MB - Normal   CBC W/ AUTO DIFFERENTIAL    Narrative:     The following orders were created for panel order CBC Auto Differential.  Procedure                               Abnormality         Status                     ---------                               -----------         ------                     CBC with Differential[[id number]]       Abnormal            Final result                 Please view results for these tests on  the individual orders.          Imaging Results              X-Ray Chest AP Portable (Final result)  Result time 11/23/23 14:33:32      Final result by Francisco Corrales MD (11/23/23 14:33:32)                   Impression:      1. No consolidation or evidence of acute cardiac decompensation.  2. Large hiatal hernia.      Electronically signed by: Francisco Corrales MD  Date:    11/23/2023  Time:    14:33               Narrative:    EXAMINATION:  XR CHEST AP PORTABLE    CLINICAL HISTORY:  Pt c/o mid sub sternal chest pain that radiates up into jaw and down into upper middle abdominal area.  Pt also c/o accompanying nausea and vomiting onset 1 hour pta.    TECHNIQUE:  Single frontal view of the chest was performed.    COMPARISON:  Frontal chest radiograph, 05/11/2023.    FINDINGS:  Large hiatal hernia.  No consolidation, pleural effusion or pneumothorax.    Cardiomediastinal silhouette is within normal limits for size.  Aortic calcifications are noted.    No acute osseous abnormality.                                       Medications   HYDROcodone-acetaminophen  mg per tablet 1 tablet (has no administration in time range)   ketorolac injection 15 mg (15 mg Intravenous Given 11/23/23 1402)   prochlorperazine injection Soln 10 mg (10 mg Intravenous Given 11/23/23 1402)   LORazepam injection 1 mg (1 mg Intravenous Given 11/23/23 1401)   pantoprazole injection 40 mg (40 mg Intravenous Given 11/23/23 1402)     Medical Decision Making  CBC, Chem 12, lipase, cardiac enzymes all unremarkable.  EKG is unremarkable.  Chest x-ray is clear.    Patient has some mild-to-moderate relief with the nonnarcotic meds and less anxious, but says it still hurts.  Give her a prescription for some pain meds as well as some nausea meds and have her follow up with Dr. Emerson.    Amount and/or Complexity of Data Reviewed  Labs: ordered.  Radiology: ordered.    Risk  Prescription drug management.                                   Clinical  Impression:  Final diagnoses:  [R07.9] Chest pain  [R10.13] Epigastric pain (Primary)          ED Disposition Condition    Discharge Stable          ED Prescriptions       Medication Sig Dispense Start Date End Date Auth. Provider    HYDROcodone-acetaminophen (NORCO)  mg per tablet Take 1 tablet by mouth every 6 (six) hours as needed. 10 tablet 11/23/2023 -- Mikhail Lal MD    promethazine (PHENERGAN) 25 MG tablet Take 1 tablet (25 mg total) by mouth every 6 (six) hours as needed for Nausea. 15 tablet 11/23/2023 -- Mikhail Lal MD          Follow-up Information       Follow up With Specialties Details Why Contact Info    Hua Emerson MD Family Medicine Call   [address] [facility]  [place]   [place] [address]  [phone]               Mikahil Lal MD  11/23/23 2192

## 2023-11-27 ENCOUNTER — OFFICE VISIT (OUTPATIENT)
Dept: FAMILY MEDICINE | Facility: CLINIC | Age: 68
End: 2023-11-27
Payer: MEDICARE

## 2023-11-27 VITALS
WEIGHT: 256.19 LBS | HEART RATE: 93 BPM | DIASTOLIC BLOOD PRESSURE: 88 MMHG | SYSTOLIC BLOOD PRESSURE: 120 MMHG | BODY MASS INDEX: 45.39 KG/M2 | HEIGHT: 63 IN | OXYGEN SATURATION: 98 % | TEMPERATURE: 97 F

## 2023-11-27 DIAGNOSIS — K44.9 HIATAL HERNIA: Primary | ICD-10-CM

## 2023-11-27 PROCEDURE — 99214 OFFICE O/P EST MOD 30 MIN: CPT | Mod: ,,, | Performed by: FAMILY MEDICINE

## 2023-11-27 PROCEDURE — 99214 PR OFFICE/OUTPT VISIT, EST, LEVL IV, 30-39 MIN: ICD-10-PCS | Mod: ,,, | Performed by: FAMILY MEDICINE

## 2023-11-27 RX ORDER — PANTOPRAZOLE SODIUM 40 MG/1
40 TABLET, DELAYED RELEASE ORAL DAILY
Qty: 30 TABLET | Refills: 2 | Status: SHIPPED | OUTPATIENT
Start: 2023-11-27 | End: 2024-03-07

## 2023-11-27 NOTE — PROGRESS NOTES
SUBJECTIVE:  Swati Connor is a 67 y.o. female here for Hernia (Needs referral for hernia repair  )      HPI  Patient is here requesting referral for hiatal hernia.  Years ago she considered having surgery for this.  She is been doing well but recently over the holidays after she took a few bites of turkey she would severe pain in her epigastric and chest area.  And burning and pressure in her chest was so bad she went to the emergency room.  There she was worked up for her heart and told it was not a cardiac issue.  Since then she is been doing better but says that that pain lasted for about 4 hours.  It was a sharp stabbing burning pressure-like pain in the center of her chest.  She has since eaten without any difficulty swallowing.    Enids allergies, medications, history, and problem list were updated as appropriate.    Review of Systems   See HPI    Recent Results (from the past 504 hour(s))   Comprehensive Metabolic Panel    Collection Time: 11/23/23  1:51 PM   Result Value Ref Range    Sodium Level 141 135 - 145 mmol/L    Potassium Level 3.8 3.5 - 5.1 mmol/L    Chloride 109 98 - 110 mmol/L    Carbon Dioxide 21 21 - 32 mmol/L    Glucose Level 122 (H) 70 - 115 mg/dL    Blood Urea Nitrogen 20.0 7.0 - 20.0 mg/dL    Creatinine 1.15 0.66 - 1.25 mg/dL    Calcium Level Total 9.3 8.4 - 10.2 mg/dL    Protein Total 7.7 6.3 - 8.2 gm/dL    Albumin Level 4.2 3.4 - 5.0 g/dL    Globulin 3.5 2.0 - 3.9 gm/dL    Albumin/Globulin Ratio 1.2 ratio    Bilirubin Total 0.4 0.0 - 1.0 mg/dL    Alkaline Phosphatase 140 50 - 144 unit/L    Alanine Aminotransferase 19 1 - 45 unit/L    Aspartate Aminotransferase 27 14 - 36 unit/L    eGFR 52 mls/min/1.73/m2    Anion Gap 11.0 2.0 - 13.0 mEq/L    BUN/Creatinine Ratio 17 12 - 20   Lipase    Collection Time: 11/23/23  1:51 PM   Result Value Ref Range    Lipase Level 253 23 - 300 U/L   Troponin I    Collection Time: 11/23/23  1:51 PM   Result Value Ref Range    Troponin-I <0.012 0.000 -  "0.034 ng/mL   CK    Collection Time: 11/23/23  1:51 PM   Result Value Ref Range    Creatine Kinase 35 30 - 135 U/L   CK-MB    Collection Time: 11/23/23  1:51 PM   Result Value Ref Range    Creatine Kinase MB <0.22 0.0 - 3.38 ng/mL   CBC with Differential    Collection Time: 11/23/23  1:51 PM   Result Value Ref Range    WBC 8.86 4.00 - 11.50 x10(3)/mcL    RBC 4.54 4.00 - 5.10 x10(6)/mcL    Hgb 12.4 11.8 - 16.0 g/dL    Hct 39.2 36.0 - 48.0 %    MCV 86.3 79.0 - 99.0 fL    MCH 27.3 27.0 - 34.0 pg    MCHC 31.6 31.0 - 37.0 g/dL    RDW 15.4 (H) 11.0 - 14.5 %    Platelet 348 140 - 371 x10(3)/mcL    MPV 11.4 9.4 - 12.4 fL    Neut % 64.2 37 - 73 %    Lymph % 28.7 20 - 55 %    Mono % 6.8 4.7 - 12.5 %    Eos % 0.0 (L) 0.7 - 7 %    Basophil % 0.1 0.1 - 1.2 %    Lymph # 2.54 1.16 - 3.74 x10(3)/mcL    Neut # 5.69 1.56 - 6.13 x10(3)/mcL    Mono # 0.60 (H) 0.24 - 0.36 x10(3)/mcL    Eos # 0.00 (L) 0.04 - 0.36 x10(3)/mcL    Baso # 0.01 0.01 - 0.08 x10(3)/mcL    IG# 0.02 0.0001 - 0.031 x10(3)/mcL    IG% 0.2 0 - 0.5 %    NRBC% 0.0 <=1 %       OBJECTIVE:  Vital signs  Vitals:    11/27/23 1623   BP: 120/88   BP Location: Left arm   Patient Position: Sitting   BP Method: Large (Manual)   Pulse: 93   Temp: 96.6 °F (35.9 °C)   TempSrc: Temporal   SpO2: 98%   Weight: 116.2 kg (256 lb 3.2 oz)   Height: 5' 2.99" (1.6 m)        Physical Exam abdomen is soft and nontender.  She is obese    ASSESSMENT/PLAN:  1. Hiatal hernia  I do believe her symptoms were from her hiatal hernia and maybe some esophagitis from reflux.  I would like to place her on a PPI.  I would like to get an esophagram and schedule her with General surgery to discuss treatment for her hiatal hernia.  She is on prednisone currently long term to treat polymyalgia rheumatica.  This is probably exacerbating symptoms.  She is also on a GLP 1 agonist which can worsen a gastric emptying but I would really like to keep her on this if at all possible for the weight loss and diabetes " treatment    -     FL Esophagram Complete; Future; Expected date: 11/27/2023  -     Ambulatory referral/consult to General Surgery; Future; Expected date: 12/04/2023    Other orders  -     pantoprazole (PROTONIX) 40 MG tablet; Take 1 tablet (40 mg total) by mouth once daily.  Dispense: 30 tablet; Refill: 2         Follow Up:  No follow-ups on file.

## 2023-12-08 ENCOUNTER — HOSPITAL ENCOUNTER (OUTPATIENT)
Dept: RADIOLOGY | Facility: HOSPITAL | Age: 68
Discharge: HOME | End: 2023-12-08
Attending: FAMILY MEDICINE
Payer: MEDICARE

## 2023-12-08 DIAGNOSIS — K44.9 HIATAL HERNIA: ICD-10-CM

## 2023-12-08 PROCEDURE — 74220 X-RAY XM ESOPHAGUS 1CNTRST: CPT | Mod: TC

## 2023-12-08 PROCEDURE — A9698 NON-RAD CONTRAST MATERIALNOC: HCPCS | Performed by: FAMILY MEDICINE

## 2023-12-08 PROCEDURE — 25500020 PHARM REV CODE 255: Performed by: FAMILY MEDICINE

## 2023-12-08 RX ADMIN — BARIUM SULFATE 140 ML: 980 POWDER, FOR SUSPENSION ORAL at 08:12

## 2023-12-11 ENCOUNTER — PATIENT MESSAGE (OUTPATIENT)
Dept: FAMILY MEDICINE | Facility: CLINIC | Age: 68
End: 2023-12-11
Payer: MEDICARE

## 2023-12-14 DIAGNOSIS — G47.00 INSOMNIA, UNSPECIFIED TYPE: ICD-10-CM

## 2023-12-14 RX ORDER — ZOLPIDEM TARTRATE 10 MG/1
10 TABLET ORAL NIGHTLY PRN
Qty: 90 TABLET | Refills: 0 | Status: SHIPPED | OUTPATIENT
Start: 2023-12-14 | End: 2024-03-18 | Stop reason: SDUPTHER

## 2023-12-22 RX ORDER — HYDROCODONE BITARTRATE AND ACETAMINOPHEN 10; 325 MG/1; MG/1
1 TABLET ORAL EVERY 6 HOURS PRN
Qty: 10 TABLET | Refills: 0 | Status: SHIPPED | OUTPATIENT
Start: 2023-12-22 | End: 2024-01-22 | Stop reason: SDUPTHER

## 2023-12-28 ENCOUNTER — TELEPHONE (OUTPATIENT)
Dept: SURGERY | Facility: CLINIC | Age: 68
End: 2023-12-28
Payer: MEDICARE

## 2024-01-18 DIAGNOSIS — M19.90 OSTEOARTHRITIS, UNSPECIFIED OSTEOARTHRITIS TYPE, UNSPECIFIED SITE: Primary | ICD-10-CM

## 2024-01-22 RX ORDER — HYDROCODONE BITARTRATE AND ACETAMINOPHEN 10; 325 MG/1; MG/1
1 TABLET ORAL EVERY 6 HOURS PRN
Qty: 10 TABLET | Refills: 0 | Status: SHIPPED | OUTPATIENT
Start: 2024-01-22 | End: 2024-01-29 | Stop reason: SDUPTHER

## 2024-01-26 DIAGNOSIS — E78.5 HYPERLIPIDEMIA, UNSPECIFIED HYPERLIPIDEMIA TYPE: Primary | ICD-10-CM

## 2024-01-26 RX ORDER — ATORVASTATIN CALCIUM 40 MG/1
TABLET, FILM COATED ORAL
Qty: 90 TABLET | Refills: 3 | Status: SHIPPED | OUTPATIENT
Start: 2024-01-26

## 2024-01-29 ENCOUNTER — TELEPHONE (OUTPATIENT)
Dept: FAMILY MEDICINE | Facility: CLINIC | Age: 69
End: 2024-01-29
Payer: MEDICARE

## 2024-01-29 DIAGNOSIS — M19.90 OSTEOARTHRITIS, UNSPECIFIED OSTEOARTHRITIS TYPE, UNSPECIFIED SITE: ICD-10-CM

## 2024-01-29 RX ORDER — HYDROCODONE BITARTRATE AND ACETAMINOPHEN 10; 325 MG/1; MG/1
1 TABLET ORAL EVERY 6 HOURS PRN
Qty: 30 TABLET | Refills: 0 | Status: SHIPPED | OUTPATIENT
Start: 2024-01-29 | End: 2024-03-18 | Stop reason: SDUPTHER

## 2024-02-08 DIAGNOSIS — F41.9 ANXIETY: ICD-10-CM

## 2024-02-08 RX ORDER — CLONAZEPAM 1 MG/1
1 TABLET ORAL 2 TIMES DAILY
Qty: 60 TABLET | Refills: 2 | OUTPATIENT
Start: 2024-02-08 | End: 2024-05-08

## 2024-02-09 RX ORDER — CLONAZEPAM 1 MG/1
1 TABLET ORAL 2 TIMES DAILY
Qty: 60 TABLET | Refills: 0 | Status: SHIPPED | OUTPATIENT
Start: 2024-02-09 | End: 2024-03-07 | Stop reason: SDUPTHER

## 2024-03-07 ENCOUNTER — OFFICE VISIT (OUTPATIENT)
Dept: FAMILY MEDICINE | Facility: CLINIC | Age: 69
End: 2024-03-07
Payer: MEDICARE

## 2024-03-07 VITALS
BODY MASS INDEX: 44.09 KG/M2 | HEART RATE: 64 BPM | SYSTOLIC BLOOD PRESSURE: 107 MMHG | HEIGHT: 63 IN | OXYGEN SATURATION: 99 % | DIASTOLIC BLOOD PRESSURE: 62 MMHG | WEIGHT: 248.81 LBS | TEMPERATURE: 97 F

## 2024-03-07 DIAGNOSIS — E11.9 TYPE 2 DIABETES MELLITUS WITHOUT COMPLICATION, WITHOUT LONG-TERM CURRENT USE OF INSULIN: ICD-10-CM

## 2024-03-07 DIAGNOSIS — E66.01 CLASS 3 SEVERE OBESITY DUE TO EXCESS CALORIES WITH SERIOUS COMORBIDITY AND BODY MASS INDEX (BMI) OF 40.0 TO 44.9 IN ADULT: ICD-10-CM

## 2024-03-07 DIAGNOSIS — G89.4 CHRONIC PAIN SYNDROME: Primary | ICD-10-CM

## 2024-03-07 DIAGNOSIS — F33.9 RECURRENT DEPRESSION: ICD-10-CM

## 2024-03-07 DIAGNOSIS — E78.5 HYPERLIPIDEMIA, UNSPECIFIED HYPERLIPIDEMIA TYPE: ICD-10-CM

## 2024-03-07 DIAGNOSIS — G89.4 CHRONIC PAIN SYNDROME: ICD-10-CM

## 2024-03-07 DIAGNOSIS — I10 PRIMARY HYPERTENSION: ICD-10-CM

## 2024-03-07 DIAGNOSIS — F41.9 ANXIETY: ICD-10-CM

## 2024-03-07 DIAGNOSIS — M35.3 PMR (POLYMYALGIA RHEUMATICA): ICD-10-CM

## 2024-03-07 PROCEDURE — 99214 OFFICE O/P EST MOD 30 MIN: CPT | Mod: ,,, | Performed by: FAMILY MEDICINE

## 2024-03-07 RX ORDER — PREDNISONE 5 MG/1
5 TABLET ORAL DAILY
Qty: 30 TABLET | Refills: 2 | Status: SHIPPED | OUTPATIENT
Start: 2024-03-07 | End: 2024-06-05

## 2024-03-07 RX ORDER — LISINOPRIL 20 MG/1
20 TABLET ORAL DAILY
Qty: 90 TABLET | Refills: 3
Start: 2024-03-07 | End: 2024-06-12 | Stop reason: SDUPTHER

## 2024-03-07 RX ORDER — CLONAZEPAM 1 MG/1
1 TABLET ORAL 2 TIMES DAILY
Qty: 60 TABLET | Refills: 2 | Status: SHIPPED | OUTPATIENT
Start: 2024-03-07 | End: 2024-05-29

## 2024-03-07 NOTE — PROGRESS NOTES
"SUBJECTIVE:  Swati Connor is a 68 y.o. female here for Medication Refill      HPI  Patient here for follow-up on chronic medical conditions.  See assessment and plan for individual list of issues.  No new problems to report at this time.  Enids allergies, medications, history, and problem list were updated as appropriate.    Review of Systems   No new problems at this time.    No results found for this or any previous visit (from the past 504 hour(s)).    OBJECTIVE:  Vital signs  Vitals:    03/07/24 1044   BP: 107/62   BP Location: Right arm   Patient Position: Sitting   Pulse: 64   Temp: 96.6 °F (35.9 °C)   TempSrc: Temporal   SpO2: 99%   Weight: 112.9 kg (248 lb 12.8 oz)   Height: 5' 2.99" (1.6 m)        Physical Exam obese, heart with a regular rate and rhythm    ASSESSMENT/PLAN:  1. Chronic pain syndrome  On chronic hydrocodone    2. Recurrent depression  Stable on venlafaxine    3. Hyperlipidemia, unspecified hyperlipidemia type  On atorvastatin  Overview:  Lab Results   Component Value Date    CHOL 223 (H) 10/16/2023    HDL 43 10/16/2023    TRIG 240 (H) 10/16/2023    DLDL 118.0 (H) 10/16/2023    DLDL 82.8 04/17/2023         4. Primary hypertension  Blood pressure well controlled on lisinopril  Overview:  Last Assessment & Plan:   On lisinopril Orders:  -     lisinopriL (PRINIVIL,ZESTRIL) 20 MG tablet; Take 1 tablet (20 mg total) by mouth once daily.  Dispense: 90 tablet; Refill: 3    5. PMR (polymyalgia rheumatica)  Myalgias has been good in her arms.  She is currently on 10 mg of prednisone daily.  We will decrease this to 5 mg daily    6. Type 2 diabetes mellitus without complication, without long-term current use of insulin  She is off Ozempic has a became too expensive  Overview:  Lab Results   Component Value Date    HGBA1C 5.8 10/16/2023    HGBA1C 5.3 04/17/2023    HGBA1C 5.1 03/21/2022          7. Class 3 severe obesity due to excess calories with serious comorbidity and body mass index (BMI) of " 40.0 to 44.9 in adult      8. Anxiety    -     clonazePAM (KLONOPIN) 1 MG tablet; Take 1 tablet (1 mg total) by mouth 2 (two) times daily.  Dispense: 60 tablet; Refill: 2  9. Hiatal hernia - she did have a visit with the surgeon in [place].  They recommended she lose 50 lb prior to doing any surgical intervention  Other orders  -     predniSONE (DELTASONE) 5 MG tablet; Take 1 tablet (5 mg total) by mouth once daily.  Dispense: 30 tablet; Refill: 2         Follow Up:  Follow up in about 3 months (around 6/7/2024) for recheck, fasting labs.

## 2024-03-08 RX ORDER — GABAPENTIN 600 MG/1
TABLET ORAL
Qty: 90 TABLET | Refills: 3 | Status: SHIPPED | OUTPATIENT
Start: 2024-03-08

## 2024-03-18 ENCOUNTER — TELEPHONE (OUTPATIENT)
Dept: FAMILY MEDICINE | Facility: CLINIC | Age: 69
End: 2024-03-18
Payer: MEDICARE

## 2024-03-18 DIAGNOSIS — M19.90 OSTEOARTHRITIS, UNSPECIFIED OSTEOARTHRITIS TYPE, UNSPECIFIED SITE: ICD-10-CM

## 2024-03-18 DIAGNOSIS — G47.00 INSOMNIA, UNSPECIFIED TYPE: ICD-10-CM

## 2024-03-18 RX ORDER — HYDROCODONE BITARTRATE AND ACETAMINOPHEN 10; 325 MG/1; MG/1
1 TABLET ORAL EVERY 6 HOURS PRN
Qty: 30 TABLET | Refills: 0 | Status: SHIPPED | OUTPATIENT
Start: 2024-03-18 | End: 2024-04-10

## 2024-03-18 RX ORDER — ZOLPIDEM TARTRATE 10 MG/1
10 TABLET ORAL NIGHTLY PRN
Qty: 90 TABLET | Refills: 0 | Status: SHIPPED | OUTPATIENT
Start: 2024-03-18 | End: 2024-06-12 | Stop reason: SDUPTHER

## 2024-04-04 ENCOUNTER — TELEPHONE (OUTPATIENT)
Dept: FAMILY MEDICINE | Facility: CLINIC | Age: 69
End: 2024-04-04
Payer: MEDICARE

## 2024-04-04 RX ORDER — NITROFURANTOIN 25; 75 MG/1; MG/1
100 CAPSULE ORAL 2 TIMES DAILY
Qty: 10 CAPSULE | Refills: 0 | Status: SHIPPED | OUTPATIENT
Start: 2024-04-04 | End: 2024-04-10

## 2024-04-04 NOTE — TELEPHONE ENCOUNTER
Pt states that she thinks she has a bladder infection. States that she gets them often & Dr. Yousif normally just calls something in for her.     She was informed that she may have to come in to do a urine. She states that she was told that she could just call in for the abx.       [phone]       [facility]

## 2024-04-10 ENCOUNTER — HOSPITAL ENCOUNTER (OUTPATIENT)
Dept: RADIOLOGY | Facility: HOSPITAL | Age: 69
Discharge: HOME | End: 2024-04-10
Attending: FAMILY MEDICINE
Payer: MEDICARE

## 2024-04-10 ENCOUNTER — OFFICE VISIT (OUTPATIENT)
Dept: FAMILY MEDICINE | Facility: CLINIC | Age: 69
End: 2024-04-10
Payer: MEDICARE

## 2024-04-10 VITALS
TEMPERATURE: 98 F | DIASTOLIC BLOOD PRESSURE: 70 MMHG | OXYGEN SATURATION: 96 % | WEIGHT: 253.81 LBS | HEART RATE: 94 BPM | BODY MASS INDEX: 44.97 KG/M2 | SYSTOLIC BLOOD PRESSURE: 110 MMHG | HEIGHT: 63 IN

## 2024-04-10 DIAGNOSIS — M79.671 RIGHT FOOT PAIN: Primary | ICD-10-CM

## 2024-04-10 DIAGNOSIS — M79.671 RIGHT FOOT PAIN: ICD-10-CM

## 2024-04-10 LAB
ANION GAP SERPL CALC-SCNC: 7 MEQ/L (ref 2–13)
BUN SERPL-MCNC: 19 MG/DL (ref 7–20)
CALCIUM SERPL-MCNC: 9.5 MG/DL (ref 8.4–10.2)
CHLORIDE SERPL-SCNC: 111 MMOL/L (ref 98–110)
CO2 SERPL-SCNC: 22 MMOL/L (ref 21–32)
CREAT SERPL-MCNC: 0.99 MG/DL (ref 0.66–1.25)
CREAT/UREA NIT SERPL: 19 (ref 12–20)
CRP SERPL-MCNC: 1.2 MG/DL (ref 0–0.9)
GFR SERPLBLD CREATININE-BSD FMLA CKD-EPI: 62 MLS/MIN/1.73/M2
GLUCOSE SERPL-MCNC: 110 MG/DL (ref 70–115)
POTASSIUM SERPL-SCNC: 4.8 MMOL/L (ref 3.5–5.1)
SODIUM SERPL-SCNC: 140 MMOL/L (ref 135–145)
URATE SERPL-MCNC: 5 MG/DL (ref 2.6–7.2)

## 2024-04-10 PROCEDURE — 73630 X-RAY EXAM OF FOOT: CPT | Mod: TC,RT

## 2024-04-10 PROCEDURE — 99214 OFFICE O/P EST MOD 30 MIN: CPT | Mod: ,,, | Performed by: FAMILY MEDICINE

## 2024-04-10 NOTE — PROGRESS NOTES
"SUBJECTIVE:  Swati Connor is a 68 y.o. female here for Foot Pain (right) and Foot Swelling (right)      HPI  Patient has been having right foot pain for about a week.  It started with some swelling and redness to the dorsum on the inside of the right foot.  The swelling and redness has gone down.  She soaked it in Epsom salt which seems to have helped a little bit.  It hurts to walk on the foot.  Pain is pretty severe.  It is causing her to limp.  She denies any trauma.  Enids allergies, medications, history, and problem list were updated as appropriate.    Review of Systems   See HPI  No results found for this or any previous visit (from the past 504 hour(s)).    OBJECTIVE:  Vital signs  Vitals:    04/10/24 1306   BP: 110/70   BP Location: Left arm   Patient Position: Sitting   BP Method: Large (Manual)   Pulse: 94   Temp: 98 °F (36.7 °C)   TempSrc: Oral   SpO2: 96%   Weight: 115.1 kg (253 lb 12.8 oz)   Height: 5' 2.99" (1.6 m)        Physical Exam exam of the right foot shows tenderness over the 1st metatarsal bone.  There was mild swelling here.  She walks with a limp that is worse with the 1st few steps.    ASSESSMENT/PLAN:  1. Right foot pain  Differential includes gout or stress fracture, tumor.  We will get an x-ray of the foot.  Check a uric acid and CRP level.  She may require a bone scan  -     Uric Acid; Future; Expected date: 10/10/2024  -     Basic Metabolic Panel; Future; Expected date: 04/10/2024  -     C-Reactive Protein; Future; Expected date: 04/10/2024  -     X-Ray Foot Complete Right; Future; Expected date: 04/10/2024         Follow Up:  No follow-ups on file.            "

## 2024-04-11 DIAGNOSIS — M79.671 RIGHT FOOT PAIN: Primary | ICD-10-CM

## 2024-04-17 DIAGNOSIS — M19.90 OSTEOARTHRITIS, UNSPECIFIED OSTEOARTHRITIS TYPE, UNSPECIFIED SITE: ICD-10-CM

## 2024-04-17 RX ORDER — HYDROCODONE BITARTRATE AND ACETAMINOPHEN 10; 325 MG/1; MG/1
1 TABLET ORAL EVERY 6 HOURS PRN
Qty: 30 TABLET | Refills: 0 | Status: SHIPPED | OUTPATIENT
Start: 2024-04-17 | End: 2024-04-18 | Stop reason: SDUPTHER

## 2024-04-18 ENCOUNTER — TELEPHONE (OUTPATIENT)
Dept: FAMILY MEDICINE | Facility: CLINIC | Age: 69
End: 2024-04-18
Payer: MEDICARE

## 2024-04-18 DIAGNOSIS — M19.90 OSTEOARTHRITIS, UNSPECIFIED OSTEOARTHRITIS TYPE, UNSPECIFIED SITE: ICD-10-CM

## 2024-04-18 RX ORDER — HYDROCODONE BITARTRATE AND ACETAMINOPHEN 10; 325 MG/1; MG/1
1 TABLET ORAL EVERY 6 HOURS PRN
Qty: 30 TABLET | Refills: 0 | Status: SHIPPED | OUTPATIENT
Start: 2024-04-18 | End: 2024-05-22 | Stop reason: SDUPTHER

## 2024-05-15 ENCOUNTER — TELEPHONE (OUTPATIENT)
Dept: FAMILY MEDICINE | Facility: CLINIC | Age: 69
End: 2024-05-15
Payer: MEDICARE

## 2024-05-15 DIAGNOSIS — K44.9 HIATAL HERNIA: Primary | ICD-10-CM

## 2024-05-15 NOTE — TELEPHONE ENCOUNTER
Pt states that she is needing a referral to Dr. Singh Santos in [place]. [phone] - for hernia.       Please advise.

## 2024-05-20 ENCOUNTER — PATIENT MESSAGE (OUTPATIENT)
Dept: FAMILY MEDICINE | Facility: CLINIC | Age: 69
End: 2024-05-20
Payer: MEDICARE

## 2024-05-22 DIAGNOSIS — M19.90 OSTEOARTHRITIS, UNSPECIFIED OSTEOARTHRITIS TYPE, UNSPECIFIED SITE: ICD-10-CM

## 2024-05-22 RX ORDER — HYDROCODONE BITARTRATE AND ACETAMINOPHEN 10; 325 MG/1; MG/1
1 TABLET ORAL EVERY 6 HOURS PRN
Qty: 30 TABLET | Refills: 0 | Status: SHIPPED | OUTPATIENT
Start: 2024-05-22

## 2024-05-29 DIAGNOSIS — F41.9 ANXIETY: ICD-10-CM

## 2024-05-29 RX ORDER — CLONAZEPAM 1 MG/1
1 TABLET ORAL NIGHTLY
Qty: 30 TABLET | Refills: 0 | Status: SHIPPED | OUTPATIENT
Start: 2024-05-29 | End: 2024-06-12 | Stop reason: SDUPTHER

## 2024-06-05 PROCEDURE — 85025 COMPLETE CBC W/AUTO DIFF WBC: CPT | Performed by: FAMILY MEDICINE

## 2024-06-05 PROCEDURE — 82043 UR ALBUMIN QUANTITATIVE: CPT | Performed by: FAMILY MEDICINE

## 2024-06-05 PROCEDURE — 84443 ASSAY THYROID STIM HORMONE: CPT | Performed by: FAMILY MEDICINE

## 2024-06-05 PROCEDURE — 80061 LIPID PANEL: CPT | Performed by: FAMILY MEDICINE

## 2024-06-05 PROCEDURE — 80053 COMPREHEN METABOLIC PANEL: CPT | Performed by: FAMILY MEDICINE

## 2024-06-05 PROCEDURE — 83036 HEMOGLOBIN GLYCOSYLATED A1C: CPT | Performed by: FAMILY MEDICINE

## 2024-06-12 ENCOUNTER — OFFICE VISIT (OUTPATIENT)
Dept: FAMILY MEDICINE | Facility: CLINIC | Age: 69
End: 2024-06-12
Payer: MEDICARE

## 2024-06-12 VITALS
HEART RATE: 78 BPM | SYSTOLIC BLOOD PRESSURE: 138 MMHG | BODY MASS INDEX: 46.71 KG/M2 | TEMPERATURE: 97 F | DIASTOLIC BLOOD PRESSURE: 98 MMHG | WEIGHT: 263.63 LBS | OXYGEN SATURATION: 99 % | HEIGHT: 63 IN

## 2024-06-12 DIAGNOSIS — M19.90 OSTEOARTHRITIS, UNSPECIFIED OSTEOARTHRITIS TYPE, UNSPECIFIED SITE: ICD-10-CM

## 2024-06-12 DIAGNOSIS — I70.0 AORTIC ATHEROSCLEROSIS: ICD-10-CM

## 2024-06-12 DIAGNOSIS — F33.9 RECURRENT DEPRESSION: Primary | ICD-10-CM

## 2024-06-12 DIAGNOSIS — E66.01 CLASS 3 SEVERE OBESITY DUE TO EXCESS CALORIES WITH SERIOUS COMORBIDITY AND BODY MASS INDEX (BMI) OF 40.0 TO 44.9 IN ADULT: ICD-10-CM

## 2024-06-12 DIAGNOSIS — E78.5 HYPERLIPIDEMIA, UNSPECIFIED HYPERLIPIDEMIA TYPE: ICD-10-CM

## 2024-06-12 DIAGNOSIS — T78.40XD ALLERGY, SUBSEQUENT ENCOUNTER: ICD-10-CM

## 2024-06-12 DIAGNOSIS — Z00.00 MEDICARE ANNUAL WELLNESS VISIT, SUBSEQUENT: ICD-10-CM

## 2024-06-12 DIAGNOSIS — E11.9 TYPE 2 DIABETES MELLITUS WITHOUT COMPLICATION, WITHOUT LONG-TERM CURRENT USE OF INSULIN: ICD-10-CM

## 2024-06-12 DIAGNOSIS — M35.3 PMR (POLYMYALGIA RHEUMATICA): ICD-10-CM

## 2024-06-12 DIAGNOSIS — I10 PRIMARY HYPERTENSION: ICD-10-CM

## 2024-06-12 DIAGNOSIS — F41.9 ANXIETY: ICD-10-CM

## 2024-06-12 DIAGNOSIS — G47.00 INSOMNIA, UNSPECIFIED TYPE: ICD-10-CM

## 2024-06-12 PROCEDURE — G0439 PPPS, SUBSEQ VISIT: HCPCS | Mod: ,,, | Performed by: FAMILY MEDICINE

## 2024-06-12 PROCEDURE — 99214 OFFICE O/P EST MOD 30 MIN: CPT | Mod: ,,, | Performed by: FAMILY MEDICINE

## 2024-06-12 PROCEDURE — 99497 ADVNCD CARE PLAN 30 MIN: CPT | Mod: 33,,, | Performed by: FAMILY MEDICINE

## 2024-06-12 RX ORDER — MONTELUKAST SODIUM 10 MG/1
10 TABLET ORAL DAILY
Qty: 30 TABLET | Refills: 6 | Status: SHIPPED | OUTPATIENT
Start: 2024-06-12

## 2024-06-12 RX ORDER — CLONAZEPAM 1 MG/1
1 TABLET ORAL 2 TIMES DAILY
Qty: 60 TABLET | Refills: 5 | Status: SHIPPED | OUTPATIENT
Start: 2024-06-12 | End: 2024-12-09

## 2024-06-12 RX ORDER — ZOLPIDEM TARTRATE 10 MG/1
10 TABLET ORAL NIGHTLY
Qty: 90 TABLET | Refills: 1 | Status: SHIPPED | OUTPATIENT
Start: 2024-06-12 | End: 2024-12-09

## 2024-06-12 RX ORDER — LISINOPRIL 20 MG/1
20 TABLET ORAL DAILY
Qty: 90 TABLET | Refills: 3 | Status: SHIPPED | OUTPATIENT
Start: 2024-06-12 | End: 2025-06-12

## 2024-06-12 RX ORDER — LISINOPRIL 20 MG/1
20 TABLET ORAL DAILY
Start: 2024-06-12 | End: 2024-06-12

## 2024-06-12 RX ORDER — SEMAGLUTIDE 1.34 MG/ML
1 INJECTION, SOLUTION SUBCUTANEOUS
Qty: 9 ML | Refills: 3 | Status: SHIPPED | OUTPATIENT
Start: 2024-06-12 | End: 2025-06-12

## 2024-06-12 NOTE — PROGRESS NOTES
SUBJECTIVE:  Swati Connor is a 68 y.o. female here for Medicare AWV      HPI  Patient here for annual Medicare wellness and follow-up on chronic medical conditions.  See assessment and plan for individual list of issues.  Since her last visit she did see the general surgeon about her hiatal hernia.  He said he had not repair it unless he did bariatric surgery at the same time because if she did not lose weight the hernia would come right back.  Enids allergies, medications, history, and problem list were updated as appropriate.    Review of Systems   Constitutional:  Negative for activity change, appetite change, fatigue and fever.   HENT:  Negative for congestion, ear pain, hearing loss, sore throat and trouble swallowing.    Eyes:  Negative for photophobia, pain, redness and visual disturbance.   Respiratory:  Negative for cough, chest tightness, shortness of breath and wheezing.    Cardiovascular:  Negative for chest pain, palpitations and leg swelling.   Gastrointestinal:  Negative for abdominal pain and blood in stool.        Reflux and gastric pressure   Endocrine: Negative for cold intolerance, heat intolerance, polydipsia and polyuria.   Genitourinary:  Negative for difficulty urinating, dysuria and frequency.   Musculoskeletal:  Positive for arthralgias. Negative for gait problem, joint swelling and myalgias.   Skin:  Negative for color change, pallor and rash.   Allergic/Immunologic: Negative.    Neurological:  Negative for dizziness, seizures, speech difficulty, weakness and headaches.   Hematological:  Negative for adenopathy. Does not bruise/bleed easily.   Psychiatric/Behavioral:  Negative for agitation and confusion.       A comprehensive review of symptoms was completed and negative except as noted above.    Recent Results (from the past 504 hour(s))   Comprehensive Metabolic Panel    Collection Time: 06/05/24  8:49 AM   Result Value Ref Range    Sodium 142 136 - 145 mmol/L    Potassium 4.5  3.5 - 5.1 mmol/L    Chloride 112 (H) 98 - 110 mmol/L    CO2 22 21 - 32 mmol/L    Glucose 111 70 - 115 mg/dL    Blood Urea Nitrogen 21 (H) 7.0 - 20.0 mg/dL    Creatinine 0.82 0.66 - 1.25 mg/dL    Calcium 8.9 8.4 - 10.2 mg/dL    Protein Total 6.2 (L) 6.3 - 8.2 gm/dL    Albumin 3.6 3.4 - 5.0 g/dL    Globulin 2.6 2.0 - 3.9 gm/dL    Albumin/Globulin Ratio 1.4 ratio    Bilirubin Total 0.4 0.0 - 1.0 mg/dL     50 - 144 unit/L    ALT 16 1 - 45 unit/L    AST 27 14 - 36 unit/L    eGFR 78 mL/min/1.73/m2    Anion Gap 8.0 2.0 - 13.0 mEq/L    BUN/Creatinine Ratio 26 (H) 12 - 20   TSH    Collection Time: 06/05/24  8:49 AM   Result Value Ref Range    TSH 5.770 (H) 0.360 - 3.740 uIU/mL   Hemoglobin A1C    Collection Time: 06/05/24  8:49 AM   Result Value Ref Range    Hemoglobin A1c 5.6 4.0 - 6.0 %    Estimated Average Glucose 114.0 70.0 - 115.0 mg/dL   Lipid Panel    Collection Time: 06/05/24  8:49 AM   Result Value Ref Range    Cholesterol Total 200 0 - 200 mg/dL    HDL Cholesterol 45 40 - 60 mg/dL    Triglyceride 250 (H) 30 - 200 mg/dL    LDL Cholesterol Direct 105.9 (H) 30.0 - 100.0 mg/dL   Microalbumin/Creatinine Ratio, Urine    Collection Time: 06/05/24  8:49 AM   Result Value Ref Range    Urine Microalbumin 9.6 <=30.0 ug/mL    Urine Creatinine 196.3 (H) 45.0 - 106.0 mg/dL    Microalbumin Creatinine Ratio 4.9 0.0 - 30.0 mg/gm Cr   CBC with Differential    Collection Time: 06/05/24  8:49 AM   Result Value Ref Range    WBC 5.94 4.00 - 11.50 x10(3)/mcL    RBC 3.99 (L) 4.00 - 5.10 x10(6)/mcL    Hgb 11.6 (L) 11.8 - 16.0 g/dL    Hct 34.4 (L) 36.0 - 48.0 %    MCV 86.2 79.0 - 99.0 fL    MCH 29.1 27.0 - 34.0 pg    MCHC 33.7 31.0 - 37.0 g/dL    RDW 14.6 (H) 11.0 - 14.5 %    Platelet 233 140 - 371 x10(3)/mcL    MPV 12.4 9.4 - 12.4 fL    Neut % 64.1 37 - 73 %    Lymph % 26.9 20 - 55 %    Mono % 8.8 4.7 - 12.5 %    Eos % 0.0 (L) 0.7 - 7 %    Basophil % 0.2 0.1 - 1.2 %    Lymph # 1.60 1.16 - 3.74 x10(3)/mcL    Neut # 3.81 1.56 - 6.13  "x10(3)/mcL    Mono # 0.52 (H) 0.24 - 0.36 x10(3)/mcL    Eos # 0.00 (L) 0.04 - 0.36 x10(3)/mcL    Baso # 0.01 0.01 - 0.08 x10(3)/mcL    IG# 0.00 (L) 0.0001 - 0.031 x10(3)/mcL    IG% 0.0 0 - 0.5 %       OBJECTIVE:  Vital signs  Vitals:    06/12/24 0924 06/12/24 1011   BP: (!) 154/87 (!) 138/98   BP Location: Right arm Right arm   Patient Position: Sitting Sitting   Pulse: 78    Temp: 96.9 °F (36.1 °C)    TempSrc: Temporal    SpO2: 99%    Weight: 119.6 kg (263 lb 9.6 oz)    Height: 5' 2.99" (1.6 m)         Physical Exam  Constitutional:       Appearance: Normal appearance. She is obese.   HENT:      Head: Normocephalic and atraumatic.      Right Ear: External ear normal.      Left Ear: External ear normal.      Nose: Nose normal.      Mouth/Throat:      Mouth: Mucous membranes are moist.      Pharynx: Oropharynx is clear.   Eyes:      Extraocular Movements: Extraocular movements intact.      Conjunctiva/sclera: Conjunctivae normal.      Pupils: Pupils are equal, round, and reactive to light.   Cardiovascular:      Rate and Rhythm: Normal rate and regular rhythm.      Heart sounds: Normal heart sounds. No murmur heard.  Pulmonary:      Effort: Pulmonary effort is normal.      Breath sounds: Normal breath sounds. No wheezing or rhonchi.   Abdominal:      General: Abdomen is flat.      Palpations: Abdomen is soft.   Musculoskeletal:         General: Normal range of motion.      Cervical back: Normal range of motion and neck supple.   Skin:     General: Skin is warm and dry.   Neurological:      General: No focal deficit present.      Mental Status: She is alert and oriented to person, place, and time.   Psychiatric:         Mood and Affect: Mood normal.         Behavior: Behavior normal.         Thought Content: Thought content normal.         Judgment: Judgment normal.          ASSESSMENT/PLAN:  1. Recurrent depression  Continue venlafaxine    2. Hyperlipidemia, unspecified hyperlipidemia type  Continue high-dose " atorvastatin  Overview:  Lab Results   Component Value Date    CHOL 223 (H) 10/16/2023    HDL 43 10/16/2023    TRIG 240 (H) 10/16/2023    DLDL 118.0 (H) 10/16/2023    DLDL 82.8 04/17/2023         3. Primary hypertension  Blood pressure elevated today but she was not taking her lisinopril and has been out the past week.  She will get back on this  Overview:  Last Assessment & Plan:   Formatting of this note might be different from the original.  - takes lisinopril  - The current medical regimen is effective;  continue present plan and medications.    Orders:  -     lisinopriL (PRINIVIL,ZESTRIL) 20 MG tablet; Take 1 tablet (20 mg total) by mouth once daily.    4. PMR (polymyalgia rheumatica)  She prednisone over the last few weeks after slowly tapering over 6 months.  She has not having any return of her muscle aches    5. Type 2 diabetes mellitus without complication, without long-term current use of insulin  Continue Ozempic  Overview:  Lab Results   Component Value Date    HGBA1C 5.8 10/16/2023    HGBA1C 5.3 04/17/2023    HGBA1C 5.1 03/21/2022          6. Class 3 severe obesity due to excess calories with serious comorbidity and body mass index (BMI) of 40.0 to 44.9 in adult      7. Osteoarthritis, unspecified osteoarthritis type, unspecified site      8. Aortic atherosclerosis  This was seen on previous chest radiography.  She is on statin therapy    9. Medicare annual wellness visit, subsequent  Up-to-date on mammogram and colon cancer screening    I offered to discuss advanced care planning,  and how to pick a person who would make decisions for you if you were unable to make them for herself, called a health care power of , and what kind of decisions you might make such as use of life-sustaining treatments such as ventilators and tube feeding when faced with a life-limiting illness recorded on a living will that they will need to know.( How to be cared for as you near the end of your natural  life)    Patient is interested in learning more about how to make advance directives.  I provided them paperwork and offered to discuss this with them.     10. Allergy, subsequent encounter    -     montelukast (SINGULAIR) 10 mg tablet; Take 1 tablet (10 mg total) by mouth once daily.  Dispense: 30 tablet; Refill: 6    11. Insomnia, unspecified type    -     zolpidem (AMBIEN) 10 mg Tab; Take 1 tablet (10 mg total) by mouth every evening.  Dispense: 90 tablet; Refill: 1    12. Anxiety    -     clonazePAM (KLONOPIN) 1 MG tablet; Take 1 tablet (1 mg total) by mouth 2 (two) times daily.  Dispense: 60 tablet; Refill: 5  13. Subclinical hypothyroidism - TSH is 5.  We will repeat levels in 3 months with a free T4  Other orders  -     semaglutide (OZEMPIC) 1 mg/dose (2 mg/1.5 mL) PnIj; Inject 1 mg into the skin every 7 days.  Dispense: 9 mL; Refill: 3         Follow Up:  Follow up in about 3 months (around 9/12/2024) for recheck, fasting labs.

## 2024-06-14 ENCOUNTER — TELEPHONE (OUTPATIENT)
Dept: FAMILY MEDICINE | Facility: CLINIC | Age: 69
End: 2024-06-14
Payer: MEDICARE

## 2024-06-14 DIAGNOSIS — R11.0 NAUSEA: Primary | ICD-10-CM

## 2024-06-14 RX ORDER — PROMETHAZINE HYDROCHLORIDE 25 MG/1
25 SUPPOSITORY RECTAL EVERY 6 HOURS PRN
Qty: 6 SUPPOSITORY | Refills: 0 | Status: SHIPPED | OUTPATIENT
Start: 2024-06-14

## 2024-06-19 DIAGNOSIS — A08.4 STOMACH FLU: Primary | ICD-10-CM

## 2024-06-19 RX ORDER — PROMETHAZINE HYDROCHLORIDE 25 MG/1
25 TABLET ORAL EVERY 6 HOURS PRN
Qty: 12 TABLET | Refills: 0 | Status: SHIPPED | OUTPATIENT
Start: 2024-06-19

## 2024-06-19 RX ORDER — DIPHENOXYLATE HYDROCHLORIDE AND ATROPINE SULFATE 2.5; .025 MG/1; MG/1
1 TABLET ORAL 4 TIMES DAILY PRN
Qty: 20 TABLET | Refills: 0 | Status: SHIPPED | OUTPATIENT
Start: 2024-06-19 | End: 2024-06-29

## 2024-06-26 DIAGNOSIS — M19.90 OSTEOARTHRITIS, UNSPECIFIED OSTEOARTHRITIS TYPE, UNSPECIFIED SITE: ICD-10-CM

## 2024-06-26 RX ORDER — HYDROCODONE BITARTRATE AND ACETAMINOPHEN 10; 325 MG/1; MG/1
1 TABLET ORAL EVERY 6 HOURS PRN
Qty: 30 TABLET | Refills: 0 | Status: SHIPPED | OUTPATIENT
Start: 2024-06-26

## 2024-07-05 DIAGNOSIS — G89.4 CHRONIC PAIN SYNDROME: ICD-10-CM

## 2024-07-05 RX ORDER — GABAPENTIN 600 MG/1
TABLET ORAL
Qty: 90 TABLET | Refills: 3 | Status: SHIPPED | OUTPATIENT
Start: 2024-07-05

## 2024-07-16 ENCOUNTER — TELEPHONE (OUTPATIENT)
Dept: FAMILY MEDICINE | Facility: CLINIC | Age: 69
End: 2024-07-16
Payer: MEDICARE

## 2024-07-22 ENCOUNTER — OFFICE VISIT (OUTPATIENT)
Dept: FAMILY MEDICINE | Facility: CLINIC | Age: 69
End: 2024-07-22
Payer: MEDICARE

## 2024-07-22 ENCOUNTER — PATIENT MESSAGE (OUTPATIENT)
Dept: FAMILY MEDICINE | Facility: CLINIC | Age: 69
End: 2024-07-22

## 2024-07-22 VITALS
OXYGEN SATURATION: 97 % | DIASTOLIC BLOOD PRESSURE: 92 MMHG | TEMPERATURE: 98 F | HEART RATE: 77 BPM | SYSTOLIC BLOOD PRESSURE: 136 MMHG

## 2024-07-22 DIAGNOSIS — M35.3 PMR (POLYMYALGIA RHEUMATICA): Primary | ICD-10-CM

## 2024-07-22 LAB
CK SERPL-CCNC: 44 U/L (ref 30–135)
CRP SERPL-MCNC: 0.9 MG/DL
ERYTHROCYTE [SEDIMENTATION RATE] IN BLOOD: 38 MM/HR (ref 0–20)

## 2024-07-22 PROCEDURE — 86140 C-REACTIVE PROTEIN: CPT | Performed by: FAMILY MEDICINE

## 2024-07-22 PROCEDURE — 82550 ASSAY OF CK (CPK): CPT | Performed by: FAMILY MEDICINE

## 2024-07-22 PROCEDURE — 85652 RBC SED RATE AUTOMATED: CPT | Performed by: FAMILY MEDICINE

## 2024-07-22 RX ORDER — PREDNISONE 10 MG/1
TABLET ORAL
Qty: 44 TABLET | Refills: 0 | Status: SHIPPED | OUTPATIENT
Start: 2024-07-22 | End: 2024-08-28

## 2024-07-22 RX ORDER — SEMAGLUTIDE 2.68 MG/ML
1 INJECTION, SOLUTION SUBCUTANEOUS
COMMUNITY
Start: 2024-06-12 | End: 2024-07-22

## 2024-07-22 RX ORDER — DEXAMETHASONE SODIUM PHOSPHATE 100 MG/10ML
10 INJECTION INTRAMUSCULAR; INTRAVENOUS ONCE
Status: COMPLETED | OUTPATIENT
Start: 2024-07-22 | End: 2024-07-22

## 2024-07-22 RX ADMIN — DEXAMETHASONE SODIUM PHOSPHATE 10 MG: 100 INJECTION INTRAMUSCULAR; INTRAVENOUS at 10:07

## 2024-07-22 NOTE — PROGRESS NOTES
SUBJECTIVE:  Swati Connor is a 68 y.o. female here for body pain      HPI  Patient is having body aches.  She hurts in her upper arms and shoulders as well as thighs and just generally all over.  The other day she wrote a riding  and could barely get off.  She took prednisone in the past for polymyalgia rheumatica but has weaned off the last several months.  This pain feels similar to that.  She has a history of diabetes.  She also has a severe hiatal hernia.  She is trying to get a surgeon to do surgery on this but says she can not find 1 who will not do the surgery without doing bariatric surgery which she can not afford.  She tried Ozempic but it made her GI symptoms worse so she discontinued this.  She also has a history of diabetes.  Enids allergies, medications, history, and problem list were updated as appropriate.    Review of Systems   See HPI  No results found for this or any previous visit (from the past 504 hour(s)).    OBJECTIVE:  Vital signs  Vitals:    07/22/24 0927   BP: (!) 136/92   BP Location: Right arm   Patient Position: Sitting   Pulse: 77   Temp: 98.2 °F (36.8 °C)   TempSrc: Oral   SpO2: 97%        Physical Exam she is obese, she has pain and tenderness to the muscles in the upper arm as well as the thighs    ASSESSMENT/PLAN:  1. PMR (polymyalgia rheumatica)  Repeat inflammatory markers today.  I offered rheumatology referral but she declined.  We will start prednisone 20 mg a day for a week and then go to 10 mg daily and I will see her back in 4 weeks  -     Sedimentation rate; Future; Expected date: 07/22/2024  -     CK; Future; Expected date: 07/22/2024  -     C-Reactive Protein; Future; Expected date: 07/22/2024    Other orders  -     predniSONE (DELTASONE) 10 MG tablet; Take 2 tablets (20 mg total) by mouth once daily for 7 days, THEN 1 tablet (10 mg total) once daily.  Dispense: 44 tablet; Refill: 0  -     dexAMETHasone injection 10 mg         Follow Up:  Follow up in  about 1 month (around 8/22/2024).

## 2024-07-23 DIAGNOSIS — M19.90 OSTEOARTHRITIS, UNSPECIFIED OSTEOARTHRITIS TYPE, UNSPECIFIED SITE: ICD-10-CM

## 2024-07-23 RX ORDER — HYDROCODONE BITARTRATE AND ACETAMINOPHEN 10; 325 MG/1; MG/1
1 TABLET ORAL EVERY 6 HOURS PRN
Qty: 30 TABLET | Refills: 0 | Status: SHIPPED | OUTPATIENT
Start: 2024-07-23

## 2024-08-21 DIAGNOSIS — M19.90 OSTEOARTHRITIS, UNSPECIFIED OSTEOARTHRITIS TYPE, UNSPECIFIED SITE: ICD-10-CM

## 2024-08-21 RX ORDER — HYDROCODONE BITARTRATE AND ACETAMINOPHEN 10; 325 MG/1; MG/1
1 TABLET ORAL EVERY 6 HOURS PRN
Qty: 30 TABLET | Refills: 0 | Status: SHIPPED | OUTPATIENT
Start: 2024-08-21

## 2024-09-23 DIAGNOSIS — M19.90 OSTEOARTHRITIS, UNSPECIFIED OSTEOARTHRITIS TYPE, UNSPECIFIED SITE: ICD-10-CM

## 2024-09-23 RX ORDER — HYDROCODONE BITARTRATE AND ACETAMINOPHEN 10; 325 MG/1; MG/1
1 TABLET ORAL EVERY 6 HOURS PRN
Qty: 30 TABLET | Refills: 0 | Status: SHIPPED | OUTPATIENT
Start: 2024-09-23

## 2024-09-26 PROCEDURE — 85025 COMPLETE CBC W/AUTO DIFF WBC: CPT | Performed by: FAMILY MEDICINE

## 2024-09-26 PROCEDURE — 84439 ASSAY OF FREE THYROXINE: CPT | Performed by: FAMILY MEDICINE

## 2024-09-26 PROCEDURE — 80061 LIPID PANEL: CPT | Performed by: FAMILY MEDICINE

## 2024-09-26 PROCEDURE — 84443 ASSAY THYROID STIM HORMONE: CPT | Performed by: FAMILY MEDICINE

## 2024-09-26 PROCEDURE — 80053 COMPREHEN METABOLIC PANEL: CPT | Performed by: FAMILY MEDICINE

## 2024-09-26 PROCEDURE — 83036 HEMOGLOBIN GLYCOSYLATED A1C: CPT | Performed by: FAMILY MEDICINE

## 2024-10-14 ENCOUNTER — OFFICE VISIT (OUTPATIENT)
Dept: FAMILY MEDICINE | Facility: CLINIC | Age: 69
End: 2024-10-14
Payer: MEDICARE

## 2024-10-14 VITALS
BODY MASS INDEX: 47.13 KG/M2 | DIASTOLIC BLOOD PRESSURE: 76 MMHG | SYSTOLIC BLOOD PRESSURE: 114 MMHG | TEMPERATURE: 97 F | OXYGEN SATURATION: 98 % | HEART RATE: 94 BPM | WEIGHT: 266 LBS | HEIGHT: 63 IN

## 2024-10-14 DIAGNOSIS — M35.3 PMR (POLYMYALGIA RHEUMATICA): ICD-10-CM

## 2024-10-14 DIAGNOSIS — D64.9 ANEMIA, UNSPECIFIED TYPE: ICD-10-CM

## 2024-10-14 DIAGNOSIS — I10 PRIMARY HYPERTENSION: ICD-10-CM

## 2024-10-14 DIAGNOSIS — Z23 ENCOUNTER FOR IMMUNIZATION: Primary | ICD-10-CM

## 2024-10-14 DIAGNOSIS — R73.02 IGT (IMPAIRED GLUCOSE TOLERANCE): ICD-10-CM

## 2024-10-14 DIAGNOSIS — E66.01 CLASS 3 SEVERE OBESITY DUE TO EXCESS CALORIES WITH SERIOUS COMORBIDITY AND BODY MASS INDEX (BMI) OF 40.0 TO 44.9 IN ADULT: ICD-10-CM

## 2024-10-14 DIAGNOSIS — E78.5 HYPERLIPIDEMIA, UNSPECIFIED HYPERLIPIDEMIA TYPE: ICD-10-CM

## 2024-10-14 DIAGNOSIS — E66.813 CLASS 3 SEVERE OBESITY DUE TO EXCESS CALORIES WITH SERIOUS COMORBIDITY AND BODY MASS INDEX (BMI) OF 40.0 TO 44.9 IN ADULT: ICD-10-CM

## 2024-10-14 DIAGNOSIS — F33.9 RECURRENT DEPRESSION: ICD-10-CM

## 2024-10-14 DIAGNOSIS — G89.4 CHRONIC PAIN SYNDROME: ICD-10-CM

## 2024-10-14 PROBLEM — E11.9 DIABETES MELLITUS: Status: RESOLVED | Noted: 2023-04-20 | Resolved: 2024-10-14

## 2024-10-14 RX ORDER — OXYCODONE HYDROCHLORIDE 10 MG/1
10 TABLET ORAL DAILY PRN
Qty: 28 TABLET | Refills: 0 | Status: SHIPPED | OUTPATIENT
Start: 2024-10-14

## 2024-10-14 RX ORDER — FERROUS SULFATE 325(65) MG
325 TABLET, DELAYED RELEASE (ENTERIC COATED) ORAL EVERY OTHER DAY
Qty: 45 TABLET | Refills: 1 | Status: SHIPPED | OUTPATIENT
Start: 2024-10-14 | End: 2025-04-12

## 2024-10-14 RX ORDER — GABAPENTIN 600 MG/1
TABLET ORAL
Qty: 90 TABLET | Refills: 3 | Status: SHIPPED | OUTPATIENT
Start: 2024-10-14

## 2024-10-14 NOTE — PROGRESS NOTES
SUBJECTIVE:  Swati Connor is a 68 y.o. female here for Follow-up (Lab results)      HPI  Patient here for follow-up on chronic medical conditions.  See assessment and plan for individual list of issues.  Swati's allergies, medications, history, and problem list were updated as appropriate.    Review of Systems   She has been stable.  The prednisone really helped her polymyalgia rheumatica pain in her extremities however she does not like the side effect of the prednisone of increasing her appetite and so stopped taking it after a few weeks.  She is not interested in getting back on it.  She recently took 1 of her daughters oxycodone for pain and she said it worked much better than her hydrocodone.  She takes hydrocodone on an as needed basis when she is hurting to be able to do housework  Recent Results (from the past 3 weeks)   Comprehensive Metabolic Panel    Collection Time: 09/26/24  8:49 AM   Result Value Ref Range    Sodium 139 136 - 145 mmol/L    Potassium 4.6 3.5 - 5.1 mmol/L    Chloride 108 98 - 110 mmol/L    CO2 23 21 - 32 mmol/L    Glucose 109 70 - 115 mg/dL    Blood Urea Nitrogen 14 7.0 - 20.0 mg/dL    Creatinine 0.92 0.66 - 1.25 mg/dL    Calcium 9.2 8.4 - 10.2 mg/dL    Protein Total 6.3 6.3 - 8.2 gm/dL    Albumin 3.7 3.4 - 5.0 g/dL    Globulin 2.6 2.0 - 3.9 gm/dL    Albumin/Globulin Ratio 1.4 ratio    Bilirubin Total 0.3 0.0 - 1.0 mg/dL     50 - 144 unit/L    ALT 17 1 - 45 unit/L    AST 26 14 - 36 unit/L    eGFR 68 mL/min/1.73/m2    Anion Gap 8.0 2.0 - 13.0 mEq/L    BUN/Creatinine Ratio 15 12 - 20   Hemoglobin A1C    Collection Time: 09/26/24  8:49 AM   Result Value Ref Range    Hemoglobin A1c 6.1 (H) 4.0 - 6.0 %    Estimated Average Glucose 128.4 (H) 70.0 - 115.0 mg/dL   Lipid Panel    Collection Time: 09/26/24  8:49 AM   Result Value Ref Range    Cholesterol Total 164 0 - 200 mg/dL    HDL Cholesterol 40 40 - 60 mg/dL    Triglyceride 285 (H) 30 - 200 mg/dL    LDL Cholesterol Direct 72.9  "30.0 - 100.0 mg/dL   T4, Free    Collection Time: 09/26/24  8:49 AM   Result Value Ref Range    Thyroxine Free 0.84 0.78 - 2.19 ng/dL   TSH    Collection Time: 09/26/24  8:49 AM   Result Value Ref Range    TSH 4.720 (H) 0.360 - 3.740 uIU/mL   CBC with Differential    Collection Time: 09/26/24  8:49 AM   Result Value Ref Range    WBC 7.71 4.00 - 11.50 x10(3)/mcL    RBC 4.10 4.00 - 5.10 x10(6)/mcL    Hgb 10.9 (L) 11.8 - 16.0 g/dL    Hct 34.4 (L) 36.0 - 48.0 %    MCV 83.9 79.0 - 99.0 fL    MCH 26.6 (L) 27.0 - 34.0 pg    MCHC 31.7 31.0 - 37.0 g/dL    RDW 15.6 (H) 11.0 - 14.5 %    Platelet 274 140 - 371 x10(3)/mcL    MPV 11.2 9.4 - 12.4 fL    Neut % 56.6 37 - 73 %    Lymph % 32.8 20 - 55 %    Mono % 9.9 4.7 - 12.5 %    Eos % 0.1 (L) 0.7 - 7 %    Basophil % 0.3 0.1 - 1.2 %    Lymph # 2.53 1.16 - 3.74 x10(3)/mcL    Neut # 4.37 1.56 - 6.13 x10(3)/mcL    Mono # 0.76 (H) 0.24 - 0.36 x10(3)/mcL    Eos # 0.01 (L) 0.04 - 0.36 x10(3)/mcL    Baso # 0.02 0.01 - 0.08 x10(3)/mcL    IG# 0.02 0.0001 - 0.031 x10(3)/mcL    IG% 0.3 0 - 0.5 %    NRBC% 0.0 <=1 %       OBJECTIVE:  Vital signs  Vitals:    10/14/24 1023   BP: 114/76   Pulse: 94   Temp: 97.3 °F (36.3 °C)   TempSrc: Oral   SpO2: 98%   Weight: 120.7 kg (266 lb)   Height: 5' 2.99" (1.6 m)        Physical Exam non ill-appearing, she has a obesity, heart with a regular rate and rhythm    ASSESSMENT/PLAN:  1. Encounter for immunization    -     influenza (adjuvanted) (Fluad) 45 mcg/0.5 mL IM vaccine (> or = 64 yo) 0.5 mL    2. Recurrent depression  On venlafaxine    3. Chronic pain syndrome  Continue gabapentin.  She takes as needed opioids.  We will switch her to oxycodone instead of the hydrocodone as this worked better  -     gabapentin (NEURONTIN) 600 MG tablet; TAKE 1 TABLET BY MOUTH THREE TIMES A DAY FOR NEURaLGIC PAIN  Dispense: 90 tablet; Refill: 3    4. Primary hypertension  Blood pressure well controlled  Overview:  Last Assessment & Plan:   Formatting of this note might be " different from the original.  - takes lisinopril  - The current medical regimen is effective;  continue present plan and medications.      5. PMR (polymyalgia rheumatica)  As she is not wanting to take prednisone may need to consider trying methotrexate or sending referral to Rheumatology to consider an IL 6 inhibitor    6. Class 3 severe obesity due to excess calories with serious comorbidity and body mass index (BMI) of 40.0 to 44.9 in adult  I told her reconsider bariatric surgery    7. Hyperlipidemia, unspecified hyperlipidemia type  On atorvastatin  Overview:  Lab Results   Component Value Date    CHOL 223 (H) 10/16/2023    HDL 43 10/16/2023    TRIG 240 (H) 10/16/2023    DLDL 118.0 (H) 10/16/2023    DLDL 82.8 04/17/2023         8. IGT (impaired glucose tolerance)  A1c is 6.1    9. Anemia, unspecified type  Hemoglobin has dropped a bit.  She has had EGD and colonoscopy.  I would like to start her on iron therapy in case she was having some blood loss.  We will check iron levels at next visit  Overview:  Lab Results   Component Value Date    HGB 10.9 (L) 09/26/2024    HGB 11.6 (L) 06/05/2024    HGB 12.4 11/23/2023         Other orders  -     oxyCODONE (ROXICODONE) 10 mg Tab immediate release tablet; Take 1 tablet (10 mg total) by mouth daily as needed for Pain.  Dispense: 28 tablet; Refill: 0  -     ferrous sulfate 325 (65 FE) MG EC tablet; Take 1 tablet (325 mg total) by mouth every other day.  Dispense: 45 tablet; Refill: 1         Follow Up:  Follow up in about 3 months (around 1/14/2025) for recheck, nonfasting labs.

## 2024-10-22 ENCOUNTER — TELEPHONE (OUTPATIENT)
Dept: FAMILY MEDICINE | Facility: CLINIC | Age: 69
End: 2024-10-22
Payer: MEDICARE

## 2024-10-31 DIAGNOSIS — K44.9 HIATAL HERNIA: Primary | ICD-10-CM

## 2024-10-31 DIAGNOSIS — K20.90 ACUTE ESOPHAGITIS: ICD-10-CM

## 2024-10-31 DIAGNOSIS — K21.00 REFLUX ESOPHAGITIS: ICD-10-CM

## 2024-11-01 ENCOUNTER — HOSPITAL ENCOUNTER (OUTPATIENT)
Dept: RADIOLOGY | Facility: HOSPITAL | Age: 69
Discharge: HOME | End: 2024-11-01
Attending: SURGERY
Payer: MEDICARE

## 2024-11-01 DIAGNOSIS — K20.90 ACUTE ESOPHAGITIS: ICD-10-CM

## 2024-11-01 DIAGNOSIS — K21.00 REFLUX ESOPHAGITIS: ICD-10-CM

## 2024-11-01 DIAGNOSIS — K44.9 HIATAL HERNIA: ICD-10-CM

## 2024-11-01 PROCEDURE — 74240 X-RAY XM UPR GI TRC 1CNTRST: CPT | Mod: TC

## 2024-11-12 ENCOUNTER — TELEPHONE (OUTPATIENT)
Dept: FAMILY MEDICINE | Facility: CLINIC | Age: 69
End: 2024-11-12
Payer: MEDICARE

## 2024-11-12 DIAGNOSIS — B37.9 YEAST INFECTION: Primary | ICD-10-CM

## 2024-11-12 RX ORDER — FLUCONAZOLE 100 MG/1
100 TABLET ORAL DAILY
Qty: 5 TABLET | Refills: 0 | Status: SHIPPED | OUTPATIENT
Start: 2024-11-12 | End: 2024-11-17

## 2024-11-14 DIAGNOSIS — M19.90 OSTEOARTHRITIS, UNSPECIFIED OSTEOARTHRITIS TYPE, UNSPECIFIED SITE: Primary | ICD-10-CM

## 2024-11-14 RX ORDER — OXYCODONE HYDROCHLORIDE 10 MG/1
10 TABLET ORAL DAILY PRN
Qty: 28 TABLET | Refills: 0 | Status: SHIPPED | OUTPATIENT
Start: 2024-11-14

## 2024-11-18 DIAGNOSIS — F33.9 RECURRENT DEPRESSION: Primary | ICD-10-CM

## 2024-11-18 RX ORDER — VENLAFAXINE HYDROCHLORIDE 150 MG/1
CAPSULE, EXTENDED RELEASE ORAL
Qty: 60 CAPSULE | Refills: 4 | Status: SHIPPED | OUTPATIENT
Start: 2024-11-18

## 2024-12-04 DIAGNOSIS — F41.9 ANXIETY: ICD-10-CM

## 2024-12-04 RX ORDER — CLONAZEPAM 1 MG/1
1 TABLET ORAL 2 TIMES DAILY
Qty: 60 TABLET | Refills: 5 | Status: SHIPPED | OUTPATIENT
Start: 2024-12-04 | End: 2025-06-02

## 2024-12-05 ENCOUNTER — TELEPHONE (OUTPATIENT)
Dept: FAMILY MEDICINE | Facility: CLINIC | Age: 69
End: 2024-12-05
Payer: MEDICARE

## 2024-12-05 RX ORDER — DOXYLAMINE SUCCINATE 25 MG
TABLET ORAL 3 TIMES DAILY
Qty: 92 G | Refills: 0 | Status: SHIPPED | OUTPATIENT
Start: 2024-12-05 | End: 2024-12-15

## 2024-12-06 DIAGNOSIS — G47.00 INSOMNIA, UNSPECIFIED TYPE: ICD-10-CM

## 2024-12-06 RX ORDER — ZOLPIDEM TARTRATE 10 MG/1
10 TABLET ORAL NIGHTLY
Qty: 90 TABLET | Refills: 1 | Status: SHIPPED | OUTPATIENT
Start: 2024-12-06

## 2024-12-11 ENCOUNTER — OFFICE VISIT (OUTPATIENT)
Dept: FAMILY MEDICINE | Facility: CLINIC | Age: 69
End: 2024-12-11
Payer: MEDICARE

## 2024-12-11 VITALS
TEMPERATURE: 97 F | SYSTOLIC BLOOD PRESSURE: 128 MMHG | HEIGHT: 63 IN | OXYGEN SATURATION: 99 % | WEIGHT: 266 LBS | DIASTOLIC BLOOD PRESSURE: 74 MMHG | HEART RATE: 75 BPM | BODY MASS INDEX: 47.13 KG/M2

## 2024-12-11 DIAGNOSIS — E66.813 CLASS 3 SEVERE OBESITY DUE TO EXCESS CALORIES WITH SERIOUS COMORBIDITY AND BODY MASS INDEX (BMI) OF 40.0 TO 44.9 IN ADULT: ICD-10-CM

## 2024-12-11 DIAGNOSIS — M35.3 PMR (POLYMYALGIA RHEUMATICA): ICD-10-CM

## 2024-12-11 DIAGNOSIS — E66.01 CLASS 3 SEVERE OBESITY DUE TO EXCESS CALORIES WITH SERIOUS COMORBIDITY AND BODY MASS INDEX (BMI) OF 40.0 TO 44.9 IN ADULT: ICD-10-CM

## 2024-12-11 DIAGNOSIS — B37.9 CANDIDIASIS: Primary | ICD-10-CM

## 2024-12-11 RX ORDER — FLUCONAZOLE 200 MG/1
200 TABLET ORAL DAILY
Qty: 10 TABLET | Refills: 0 | Status: SHIPPED | OUTPATIENT
Start: 2024-12-11 | End: 2024-12-21

## 2024-12-11 RX ORDER — NYSTATIN 100000 [USP'U]/G
POWDER TOPICAL 4 TIMES DAILY
Qty: 60 G | Refills: 3 | Status: SHIPPED | OUTPATIENT
Start: 2024-12-11 | End: 2024-12-21

## 2024-12-11 NOTE — PROGRESS NOTES
"SUBJECTIVE:  Swati Connor is a 68 y.o. female here for Rash (2 wks)      HPI  Patient has a rash in the skin folds under her breast and abdomen for several weeks.  We tried miconazole cream.  She has used over-the-counter gold bond powder.  Nothing has helped.  She took 5 days of Diflucan but only 100 mg daily.  She is on chronic prednisone therapy for polymyalgia rheumatica.  Enids allergies, medications, history, and problem list were updated as appropriate.    Review of Systems   See HPI  No results found for this or any previous visit (from the past 3 weeks).    OBJECTIVE:  Vital signs  Vitals:    12/11/24 1056   BP: 128/74   BP Location: Right arm   Patient Position: Sitting   Pulse: 75   Temp: 97 °F (36.1 °C)   TempSrc: Oral   SpO2: 99%   Weight: 120.7 kg (266 lb)   Height: 5' 2.99" (1.6 m)        Physical Exam macular erythematous rash with some maceration under the breast and in skin folds    ASSESSMENT/PLAN:  1. Candidiasis  We will try nystatin powder 4 times a day along with Diflucan 200 mg daily for 10 days    2. Class 3 severe obesity due to excess calories with serious comorbidity and body mass index (BMI) of 40.0 to 44.9 in adult  This increases her risk for candidiasis    3. PMR (polymyalgia rheumatica)  On chronic low-dose prednisone therapy    Other orders  -     fluconazole (DIFLUCAN) 200 MG Tab; Take 1 tablet (200 mg total) by mouth once daily. for 10 days  Dispense: 10 tablet; Refill: 0  -     nystatin (MYCOSTATIN) powder; Apply topically 4 (four) times daily. for 10 days  Dispense: 60 g; Refill: 3         Follow Up:  No follow-ups on file.            "

## 2024-12-17 DIAGNOSIS — M19.90 OSTEOARTHRITIS, UNSPECIFIED OSTEOARTHRITIS TYPE, UNSPECIFIED SITE: ICD-10-CM

## 2024-12-17 RX ORDER — OXYCODONE HYDROCHLORIDE 10 MG/1
10 TABLET ORAL DAILY PRN
Qty: 28 TABLET | Refills: 0 | Status: SHIPPED | OUTPATIENT
Start: 2024-12-17

## 2025-01-14 DIAGNOSIS — M19.90 OSTEOARTHRITIS, UNSPECIFIED OSTEOARTHRITIS TYPE, UNSPECIFIED SITE: ICD-10-CM

## 2025-01-16 RX ORDER — OXYCODONE HYDROCHLORIDE 10 MG/1
10 TABLET ORAL DAILY PRN
Qty: 28 TABLET | Refills: 0 | Status: SHIPPED | OUTPATIENT
Start: 2025-01-16

## 2025-02-04 PROCEDURE — 83550 IRON BINDING TEST: CPT | Performed by: FAMILY MEDICINE

## 2025-02-04 PROCEDURE — 85025 COMPLETE CBC W/AUTO DIFF WBC: CPT | Performed by: FAMILY MEDICINE

## 2025-02-04 PROCEDURE — 82728 ASSAY OF FERRITIN: CPT | Performed by: FAMILY MEDICINE

## 2025-02-04 PROCEDURE — 83036 HEMOGLOBIN GLYCOSYLATED A1C: CPT | Performed by: FAMILY MEDICINE

## 2025-02-06 DIAGNOSIS — T78.40XD ALLERGY, SUBSEQUENT ENCOUNTER: ICD-10-CM

## 2025-02-06 RX ORDER — MONTELUKAST SODIUM 10 MG/1
10 TABLET ORAL
Qty: 30 TABLET | Refills: 6 | Status: SHIPPED | OUTPATIENT
Start: 2025-02-06

## 2025-02-12 ENCOUNTER — PATIENT MESSAGE (OUTPATIENT)
Dept: FAMILY MEDICINE | Facility: CLINIC | Age: 70
End: 2025-02-12
Payer: MEDICARE

## 2025-02-18 DIAGNOSIS — M19.90 OSTEOARTHRITIS, UNSPECIFIED OSTEOARTHRITIS TYPE, UNSPECIFIED SITE: ICD-10-CM

## 2025-02-18 RX ORDER — OXYCODONE HYDROCHLORIDE 10 MG/1
10 TABLET ORAL DAILY PRN
Qty: 28 TABLET | Refills: 0 | Status: SHIPPED | OUTPATIENT
Start: 2025-02-18

## 2025-02-20 ENCOUNTER — OFFICE VISIT (OUTPATIENT)
Dept: FAMILY MEDICINE | Facility: CLINIC | Age: 70
End: 2025-02-20
Payer: MEDICARE

## 2025-02-20 VITALS
OXYGEN SATURATION: 97 % | DIASTOLIC BLOOD PRESSURE: 80 MMHG | SYSTOLIC BLOOD PRESSURE: 130 MMHG | TEMPERATURE: 98 F | HEART RATE: 78 BPM | WEIGHT: 242.81 LBS | HEIGHT: 63 IN | BODY MASS INDEX: 43.02 KG/M2

## 2025-02-20 DIAGNOSIS — Z98.84 STATUS POST BARIATRIC SURGERY: ICD-10-CM

## 2025-02-20 DIAGNOSIS — Z98.890 OTHER SPECIFIED POSTPROCEDURAL STATES: ICD-10-CM

## 2025-02-20 DIAGNOSIS — M51.362 DEGENERATION OF INTERVERTEBRAL DISC OF LUMBAR REGION WITH DISCOGENIC BACK PAIN AND LOWER EXTREMITY PAIN: Primary | ICD-10-CM

## 2025-02-20 DIAGNOSIS — E78.5 HYPERLIPIDEMIA, UNSPECIFIED HYPERLIPIDEMIA TYPE: ICD-10-CM

## 2025-02-20 DIAGNOSIS — K90.49 MALABSORPTION DUE TO INTOLERANCE, NOT ELSEWHERE CLASSIFIED: ICD-10-CM

## 2025-02-20 DIAGNOSIS — R73.02 IGT (IMPAIRED GLUCOSE TOLERANCE): ICD-10-CM

## 2025-02-20 RX ORDER — ONDANSETRON 4 MG/1
4 TABLET, ORALLY DISINTEGRATING ORAL EVERY 6 HOURS PRN
COMMUNITY
Start: 2024-12-26

## 2025-02-20 RX ORDER — OLOPATADINE HYDROCHLORIDE 1 MG/ML
1 SOLUTION/ DROPS OPHTHALMIC 2 TIMES DAILY
COMMUNITY
Start: 2024-12-13

## 2025-02-20 RX ORDER — MULTIVITAMIN
1 TABLET ORAL EVERY MORNING
COMMUNITY

## 2025-02-20 NOTE — PROGRESS NOTES
SUBJECTIVE:  Swati Connor is a 69 y.o. female here for 3 mo follow up-review labs      HPI  Patient here for follow-up on chronic medical conditions.  See assessment and plan for individual list of issues.  Since her last visit with me she had her hiatal hernia repair as well as what sounds like a gastric bypass procedure.  She has lost 24 lb in the last 2 months.  She is eating just small quantities.  Blood pressure has been low and she had to cut back on her lisinopril.  She also stopped taking her atorvastatin.  She is still having a lot of low back pain.  She has been having to take hydrocodone at night.  Enids allergies, medications, history, and problem list were updated as appropriate.    Review of Systems   See HPI  Recent Results (from the past 3 weeks)   Ferritin    Collection Time: 02/04/25 11:27 AM   Result Value Ref Range    Ferritin Level 49.1 6.24 - 264.00 ng/mL   Iron and TIBC    Collection Time: 02/04/25 11:27 AM   Result Value Ref Range    Iron Binding Capacity Unsaturated 204 70 - 310 ug/dL    Iron Level 84 50 - 170 ug/dL    Transferrin 251 173 - 360 mg/dL    Iron Binding Capacity Total 288 250 - 450 ug/dL    Iron Saturation 29 20 - 50 %   Hemoglobin A1C    Collection Time: 02/04/25 11:27 AM   Result Value Ref Range    Hemoglobin A1c 4.8 4.0 - 6.0 %    Estimated Average Glucose 91.1 70.0 - 115.0 mg/dL   CBC with Differential    Collection Time: 02/04/25 11:27 AM   Result Value Ref Range    WBC 5.90 4.00 - 11.50 x10(3)/mcL    RBC 4.64 4.00 - 5.10 x10(6)/mcL    Hgb 12.9 11.8 - 16.0 g/dL    Hct 41.4 36.0 - 48.0 %    MCV 89.2 79.0 - 99.0 fL    MCH 27.8 27.0 - 34.0 pg    MCHC 31.2 31.0 - 37.0 g/dL    RDW 16.9 (H) 11.0 - 14.5 %    Platelet 248 140 - 371 x10(3)/mcL    MPV 13.7 (H) 9.4 - 12.4 fL    Neut % 68.3 37 - 73 %    Lymph % 22.7 20 - 55 %    Mono % 8.6 4.7 - 12.5 %    Eos % 0.0 (L) 0.7 - 7 %    Basophil % 0.2 0.1 - 1.2 %    Lymph # 1.34 1.16 - 3.74 x10(3)/mcL    Neut # 4.03 1.56 - 6.13  "x10(3)/mcL    Mono # 0.51 (H) 0.24 - 0.36 x10(3)/mcL    Eos # 0.00 (L) 0.04 - 0.36 x10(3)/mcL    Baso # 0.01 0.01 - 0.08 x10(3)/mcL    Imm Gran # 0.01 0.00 - 0.03 x10(3)/mcL    Imm Grans % 0.2 0 - 0.5 %   Blood Smear Microscopic Exam    Collection Time: 02/04/25 11:27 AM   Result Value Ref Range    RBC Morph Normal Normal    Platelets Normal Normal, Adequate       OBJECTIVE:  Vital signs  Vitals:    02/20/25 1457   BP: 130/80   BP Location: Right arm   Patient Position: Sitting   Pulse: 78   Temp: 97.7 °F (36.5 °C)   TempSrc: Oral   SpO2: 97%   Weight: 110.1 kg (242 lb 12.8 oz)   Height: 5' 2.99" (1.6 m)        Physical Exam with a regular rate and rhythm    ASSESSMENT/PLAN:  1. Degeneration of intervertebral disc of lumbar region with discogenic back pain and lower extremity pain  We would like to refer her to physiatry for possible injections    2. IGT (impaired glucose tolerance)  A1c is down since her gastric surgery.  She is doing very well    3. Hyperlipidemia, unspecified hyperlipidemia type  She can stay off of atorvastatin for now and we will repeat labs in 3 months      4. Hypertension - cut lisinopril back to 10 mg daily           Follow Up:  Follow up in about 3 months (around 5/20/2025) for recheck, fasting labs.            "

## 2025-02-28 DIAGNOSIS — G89.4 CHRONIC PAIN SYNDROME: ICD-10-CM

## 2025-02-28 RX ORDER — GABAPENTIN 600 MG/1
TABLET ORAL
Qty: 90 TABLET | Refills: 3 | Status: SHIPPED | OUTPATIENT
Start: 2025-02-28

## 2025-03-20 DIAGNOSIS — M19.90 OSTEOARTHRITIS, UNSPECIFIED OSTEOARTHRITIS TYPE, UNSPECIFIED SITE: ICD-10-CM

## 2025-03-20 RX ORDER — OXYCODONE HYDROCHLORIDE 10 MG/1
10 TABLET ORAL DAILY PRN
Qty: 28 TABLET | Refills: 0 | Status: SHIPPED | OUTPATIENT
Start: 2025-03-20

## 2025-04-15 DIAGNOSIS — F33.9 RECURRENT DEPRESSION: ICD-10-CM

## 2025-04-15 RX ORDER — VENLAFAXINE HYDROCHLORIDE 150 MG/1
CAPSULE, EXTENDED RELEASE ORAL
Qty: 60 CAPSULE | Refills: 4 | Status: SHIPPED | OUTPATIENT
Start: 2025-04-15

## 2025-04-21 DIAGNOSIS — M19.90 OSTEOARTHRITIS, UNSPECIFIED OSTEOARTHRITIS TYPE, UNSPECIFIED SITE: ICD-10-CM

## 2025-04-21 RX ORDER — OXYCODONE HYDROCHLORIDE 10 MG/1
10 TABLET ORAL DAILY PRN
Qty: 28 TABLET | Refills: 0 | Status: SHIPPED | OUTPATIENT
Start: 2025-04-21

## 2025-05-06 DIAGNOSIS — D64.9 ANEMIA, UNSPECIFIED TYPE: Primary | ICD-10-CM

## 2025-05-06 RX ORDER — FERROUS SULFATE 325(65) MG
TABLET ORAL EVERY OTHER DAY
Qty: 45 TABLET | Refills: 1 | Status: SHIPPED | OUTPATIENT
Start: 2025-05-06

## 2025-05-23 ENCOUNTER — PATIENT OUTREACH (OUTPATIENT)
Facility: CLINIC | Age: 70
End: 2025-05-23
Payer: MEDICARE

## 2025-05-27 ENCOUNTER — TELEPHONE (OUTPATIENT)
Dept: FAMILY MEDICINE | Facility: CLINIC | Age: 70
End: 2025-05-27
Payer: MEDICARE

## 2025-05-27 DIAGNOSIS — M19.90 OSTEOARTHRITIS, UNSPECIFIED OSTEOARTHRITIS TYPE, UNSPECIFIED SITE: ICD-10-CM

## 2025-05-27 DIAGNOSIS — F41.9 ANXIETY: ICD-10-CM

## 2025-05-27 RX ORDER — CLONAZEPAM 1 MG/1
1 TABLET ORAL 2 TIMES DAILY
Qty: 60 TABLET | Refills: 5 | Status: SHIPPED | OUTPATIENT
Start: 2025-05-27

## 2025-05-27 RX ORDER — OXYCODONE HYDROCHLORIDE 10 MG/1
10 TABLET ORAL DAILY PRN
Qty: 28 TABLET | Refills: 0 | Status: SHIPPED | OUTPATIENT
Start: 2025-05-27

## 2025-05-28 DIAGNOSIS — G47.00 INSOMNIA, UNSPECIFIED TYPE: ICD-10-CM

## 2025-05-28 RX ORDER — ZOLPIDEM TARTRATE 10 MG/1
10 TABLET ORAL NIGHTLY
Qty: 90 TABLET | Refills: 1 | Status: SHIPPED | OUTPATIENT
Start: 2025-05-28

## 2025-06-02 PROCEDURE — 84425 ASSAY OF VITAMIN B-1: CPT | Performed by: SURGERY

## 2025-06-20 ENCOUNTER — TELEPHONE (OUTPATIENT)
Dept: FAMILY MEDICINE | Facility: CLINIC | Age: 70
End: 2025-06-20
Payer: MEDICARE

## 2025-06-25 ENCOUNTER — OFFICE VISIT (OUTPATIENT)
Dept: FAMILY MEDICINE | Facility: CLINIC | Age: 70
End: 2025-06-25
Payer: MEDICARE

## 2025-06-25 VITALS
DIASTOLIC BLOOD PRESSURE: 80 MMHG | TEMPERATURE: 99 F | OXYGEN SATURATION: 95 % | WEIGHT: 217 LBS | HEART RATE: 67 BPM | SYSTOLIC BLOOD PRESSURE: 120 MMHG | BODY MASS INDEX: 38.45 KG/M2 | HEIGHT: 63 IN

## 2025-06-25 DIAGNOSIS — Z00.00 MEDICARE ANNUAL WELLNESS VISIT, SUBSEQUENT: Primary | ICD-10-CM

## 2025-06-25 DIAGNOSIS — M19.90 OSTEOARTHRITIS, UNSPECIFIED OSTEOARTHRITIS TYPE, UNSPECIFIED SITE: ICD-10-CM

## 2025-06-25 DIAGNOSIS — F33.9 RECURRENT DEPRESSION: ICD-10-CM

## 2025-06-25 DIAGNOSIS — J32.9 CHRONIC SINUSITIS, UNSPECIFIED LOCATION: ICD-10-CM

## 2025-06-25 DIAGNOSIS — G62.9 PERIPHERAL POLYNEUROPATHY: ICD-10-CM

## 2025-06-25 DIAGNOSIS — I10 PRIMARY HYPERTENSION: ICD-10-CM

## 2025-06-25 DIAGNOSIS — E78.5 HYPERLIPIDEMIA, UNSPECIFIED HYPERLIPIDEMIA TYPE: ICD-10-CM

## 2025-06-25 DIAGNOSIS — E66.813 CLASS 3 SEVERE OBESITY DUE TO EXCESS CALORIES WITH SERIOUS COMORBIDITY AND BODY MASS INDEX (BMI) OF 40.0 TO 44.9 IN ADULT: ICD-10-CM

## 2025-06-25 DIAGNOSIS — M51.362 DEGENERATION OF INTERVERTEBRAL DISC OF LUMBAR REGION WITH DISCOGENIC BACK PAIN AND LOWER EXTREMITY PAIN: ICD-10-CM

## 2025-06-25 DIAGNOSIS — G60.3 IDIOPATHIC PROGRESSIVE NEUROPATHY: ICD-10-CM

## 2025-06-25 DIAGNOSIS — E66.01 CLASS 3 SEVERE OBESITY DUE TO EXCESS CALORIES WITH SERIOUS COMORBIDITY AND BODY MASS INDEX (BMI) OF 40.0 TO 44.9 IN ADULT: ICD-10-CM

## 2025-06-25 PROCEDURE — G0439 PPPS, SUBSEQ VISIT: HCPCS | Mod: ,,, | Performed by: FAMILY MEDICINE

## 2025-06-25 PROCEDURE — 99214 OFFICE O/P EST MOD 30 MIN: CPT | Mod: ,,, | Performed by: FAMILY MEDICINE

## 2025-06-25 RX ORDER — VENLAFAXINE HYDROCHLORIDE 150 MG/1
150 CAPSULE, EXTENDED RELEASE ORAL DAILY
Start: 2025-06-25

## 2025-06-25 RX ORDER — OXYCODONE HYDROCHLORIDE 10 MG/1
10 TABLET ORAL EVERY 12 HOURS PRN
Qty: 45 TABLET | Refills: 0 | Status: SHIPPED | OUTPATIENT
Start: 2025-06-25

## 2025-06-25 RX ORDER — PREDNISOLONE ACETATE 10 MG/ML
1 SUSPENSION/ DROPS OPHTHALMIC 4 TIMES DAILY
COMMUNITY
Start: 2025-04-07

## 2025-06-25 RX ORDER — DULOXETIN HYDROCHLORIDE 30 MG/1
30 CAPSULE, DELAYED RELEASE ORAL DAILY
Qty: 30 CAPSULE | Refills: 3 | Status: SHIPPED | OUTPATIENT
Start: 2025-06-25

## 2025-06-25 RX ORDER — CEFDINIR 300 MG/1
300 CAPSULE ORAL 2 TIMES DAILY
Qty: 28 CAPSULE | Refills: 0 | Status: SHIPPED | OUTPATIENT
Start: 2025-06-25 | End: 2025-07-09

## 2025-06-25 RX ORDER — PERFLUOROHEXYLOCTANE 1 MG/MG
1 SOLUTION OPHTHALMIC
COMMUNITY
Start: 2025-04-07

## 2025-06-25 NOTE — PROGRESS NOTES
SUBJECTIVE:  Swati Connor is a 69 y.o. female here for Medicare AWV Follow Up and Nasal Congestion      HPI  Patient here for annual Medicare wellness and follow-up on chronic medical conditions.  See assessment and plan for individual list of issues.  Since her bariatric surgery she has lost about 50 lb.  She is having some neuropathy symptoms with burning and tingling in her feet hands and lips.  Recent B12 folate and iron levels were normal.  She has been having sinus congestion for several weeks  Enids allergies, medications, history, and problem list were updated as appropriate.    Review of Systems   Constitutional:  Negative for activity change, appetite change, fatigue and fever.   HENT:  Positive for congestion. Negative for ear pain, hearing loss, sore throat and trouble swallowing.    Eyes:  Negative for photophobia, pain, redness and visual disturbance.   Respiratory:  Negative for cough, chest tightness, shortness of breath and wheezing.    Cardiovascular:  Negative for chest pain, palpitations and leg swelling.   Gastrointestinal:  Negative for abdominal distention, abdominal pain and blood in stool.   Endocrine: Negative for cold intolerance, heat intolerance, polydipsia and polyuria.   Genitourinary:  Negative for difficulty urinating, dysuria and frequency.   Musculoskeletal:  Negative for arthralgias, gait problem, joint swelling and myalgias.   Skin:  Negative for color change, pallor and rash.   Allergic/Immunologic: Negative.    Neurological:  Negative for dizziness, seizures, speech difficulty, weakness and headaches.   Hematological:  Negative for adenopathy. Does not bruise/bleed easily.   Psychiatric/Behavioral:  Negative for agitation and confusion.       A comprehensive review of symptoms was completed and negative except as noted above.    No results found for this or any previous visit (from the past 3 weeks).    OBJECTIVE:  Vital signs  Vitals:    06/25/25 0851   BP: 120/80   BP  "Location: Left arm   Patient Position: Sitting   Pulse: 67   Temp: 98.7 °F (37.1 °C)   TempSrc: Oral   SpO2: 95%   Weight: 98.4 kg (217 lb)   Height: 5' 2.99" (1.6 m)        Physical Exam  Constitutional:       Appearance: Normal appearance. She is obese.   HENT:      Head: Normocephalic and atraumatic.      Comments: Bilateral tympanic membranes with thick cloudy fluid, nares with erythema     Right Ear: External ear normal.      Left Ear: External ear normal.      Nose: Nose normal.      Mouth/Throat:      Mouth: Mucous membranes are moist.      Pharynx: Oropharynx is clear.   Eyes:      Extraocular Movements: Extraocular movements intact.      Conjunctiva/sclera: Conjunctivae normal.      Pupils: Pupils are equal, round, and reactive to light.   Cardiovascular:      Rate and Rhythm: Normal rate and regular rhythm.      Heart sounds: Normal heart sounds. No murmur heard.  Pulmonary:      Effort: Pulmonary effort is normal.      Breath sounds: Normal breath sounds. No wheezing or rhonchi.   Abdominal:      General: Abdomen is flat.      Palpations: Abdomen is soft.   Musculoskeletal:         General: Normal range of motion.      Cervical back: Normal range of motion and neck supple.   Skin:     General: Skin is warm and dry.   Neurological:      General: No focal deficit present.      Mental Status: She is alert and oriented to person, place, and time.      Gait: Gait abnormal.   Psychiatric:         Mood and Affect: Mood normal.         Behavior: Behavior normal.         Thought Content: Thought content normal.         Judgment: Judgment normal.          ASSESSMENT/PLAN:  1. Medicare annual wellness visit, subsequent  I offered to discuss advanced care planning,  and how to pick a person who would make decisions for you if you were unable to make them for herself, called a health care power of , and what kind of decisions you might make such as use of life-sustaining treatments such as ventilators and tube " feeding when faced with a life-limiting illness recorded on a living will that they will need to know.( How to be cared for as you near the end of your natural life)    Patient is interested in learning more about how to make advance directives.  I provided them paperwork and offered to discuss this with them.  Patient is still enjoys an active lifestyle and once all medical procedures to extend life.  Her  would be her primary decision maker in the event she can not    Other Medicare providers include bariatric surgery    Cancer screening includes breast cancer and colon cancer screening.  She will be due a mammogram in August, colon cancer screening not due until 2030        2. Primary hypertension  Blood pressure well controlled without medications since she lost weight  Overview:  Last Assessment & Plan:   Formatting of this note might be different from the original.  - takes lisinopril  - The current medical regimen is effective;  continue present plan and medications.      3. Hyperlipidemia, unspecified hyperlipidemia type  Patient has stopped atorvastatin  Lab Results   Component Value Date    CHOL 250 (H) 06/02/2025    HDL 43 06/02/2025    LDLDIRECT 144.8 (H) 06/02/2025    TRIG 262 (H) 06/02/2025    TOTALCHOLEST 4.3 03/19/2021           4. Class 3 severe obesity due to excess calories with serious comorbidity and body mass index (BMI) of 40.0 to 44.9 in adult  She has lost weight since her bariatric surgery and continues to lose    5. Osteoarthritis, unspecified osteoarthritis type, unspecified site  She is taking oxycodone every night and then sometimes takes it during the day when she is doing housework.  She has only been getting 28 per month so we will increase this to 45 to allow her a pill to take during the day as she finds she can get a lot more done with the pain is better controlled.   report was reviewed  -     oxyCODONE (ROXICODONE) 10 mg Tab immediate release tablet; Take 1 tablet (10  mg total) by mouth every 12 (twelve) hours as needed for Pain.  Dispense: 45 tablet; Refill: 0    6. Degeneration of intervertebral disc of lumbar region with discogenic back pain and lower extremity pain      7. Peripheral polyneuropathy  Add Cymbalta 30 mg        9. Recurrent depression  Does not feel depression has been working as well.  We will cut back her Effexor to 150 mg daily and add Cymbalta  -     venlafaxine (EFFEXOR-XR) 150 MG Cp24; Take 1 capsule (150 mg total) by mouth once daily.    10. Chronic sinusitis, unspecified location  Cefdinir for 2 weeks    Other orders  -     DULoxetine (CYMBALTA) 30 MG capsule; Take 1 capsule (30 mg total) by mouth once daily.  Dispense: 30 capsule; Refill: 3  -     cefdinir (OMNICEF) 300 MG capsule; Take 1 capsule (300 mg total) by mouth 2 (two) times daily. for 14 days  Dispense: 28 capsule; Refill: 0         Follow Up:  Follow up in about 2 months (around 8/25/2025).  To see how duloxetine in his helping

## 2025-07-11 DIAGNOSIS — G89.4 CHRONIC PAIN SYNDROME: ICD-10-CM

## 2025-07-11 RX ORDER — GABAPENTIN 600 MG/1
TABLET ORAL
Qty: 90 TABLET | Refills: 3 | Status: SHIPPED | OUTPATIENT
Start: 2025-07-11

## 2025-07-24 ENCOUNTER — OFFICE VISIT (OUTPATIENT)
Dept: FAMILY MEDICINE | Facility: CLINIC | Age: 70
End: 2025-07-24
Payer: MEDICARE

## 2025-07-24 VITALS
TEMPERATURE: 99 F | BODY MASS INDEX: 37.98 KG/M2 | DIASTOLIC BLOOD PRESSURE: 58 MMHG | WEIGHT: 214.38 LBS | HEIGHT: 63 IN | OXYGEN SATURATION: 98 % | SYSTOLIC BLOOD PRESSURE: 94 MMHG | HEART RATE: 65 BPM

## 2025-07-24 DIAGNOSIS — I95.1 ORTHOSTASIS: ICD-10-CM

## 2025-07-24 DIAGNOSIS — M17.12 PRIMARY OSTEOARTHRITIS OF LEFT KNEE: Primary | ICD-10-CM

## 2025-07-24 DIAGNOSIS — Z01.818 PRE-OPERATIVE CLEARANCE: ICD-10-CM

## 2025-07-24 RX ORDER — ASPIRIN 325 MG
50000 TABLET, DELAYED RELEASE (ENTERIC COATED) ORAL
COMMUNITY
Start: 2025-07-21

## 2025-07-24 NOTE — PROGRESS NOTES
"SUBJECTIVE:  Swati Connor is a 69 y.o. female here for Surgery clearance      HPI  Patient here for surgery clearance.  She is scheduled for total knee arthroplasty next week.  In her preop clearance labs she had low vitamin-D of 19.  A month ago we did labs and has a vitamin-D of 33.  She had an EKG that showed some mild bradycardia but otherwise looks normal.  She just underwent major surgery for multiple hours under anesthesia for hiatal hernia repair with gastric bypass.  She has now lost 60 lb.  She is doing much better overall.  She does get a little dizzy and lightheaded when she stands.  Enids allergies, medications, history, and problem list were updated as appropriate.    Review of Systems   No shortness of breath orthopnea.  She does have left knee pain    No results found for this or any previous visit (from the past 3 weeks).    OBJECTIVE:  Vital signs  Vitals:    07/24/25 1305   BP: (!) 94/58   BP Location: Right arm   Patient Position: Sitting   Pulse: 65   Temp: 98.7 °F (37.1 °C)   TempSrc: Oral   SpO2: 98%   Weight: 97.3 kg (214 lb 6.4 oz)   Height: 5' 2.99" (1.6 m)        Physical Exam heart with a regular rate and rhythm without murmur, lungs are clear to auscultation bilaterally    ASSESSMENT/PLAN:  1. Primary osteoarthritis of left knee  She is scheduled for total knee arthroplasty next week.  Her vitamin-D was a little low and so she is taking some prescription vitamin-D at this time however I am happy to say that a month ago her vitamin-D levels was within normal range so I do not think this is a total body volume vitamin-D deficiency.  2. Relative hypotension - I think she is probably not getting enough salt in her diet and asked her to take liquid IV daily.  Also try to cut her gabapentin down to 300 mg 3 times a day instead of 600 mg 3 times a day.  She is off hypertension medicines since her gastric bypass surgery     I think she is pretty low risk for any cardiovascular or " pulmonary issues during surgery.  She is a good candidate for outpatient day surgery.    Follow Up:  No follow-ups on file.

## 2025-07-30 DIAGNOSIS — M19.90 OSTEOARTHRITIS, UNSPECIFIED OSTEOARTHRITIS TYPE, UNSPECIFIED SITE: ICD-10-CM

## 2025-07-30 RX ORDER — OXYCODONE HYDROCHLORIDE 10 MG/1
10 TABLET ORAL EVERY 12 HOURS PRN
Qty: 45 TABLET | Refills: 0 | Status: SHIPPED | OUTPATIENT
Start: 2025-07-30

## 2025-08-18 ENCOUNTER — PATIENT MESSAGE (OUTPATIENT)
Dept: FAMILY MEDICINE | Facility: CLINIC | Age: 70
End: 2025-08-18
Payer: MEDICARE

## 2025-08-18 ENCOUNTER — TELEPHONE (OUTPATIENT)
Dept: FAMILY MEDICINE | Facility: CLINIC | Age: 70
End: 2025-08-18
Payer: MEDICARE

## 2025-08-18 RX ORDER — SULFAMETHOXAZOLE AND TRIMETHOPRIM 800; 160 MG/1; MG/1
1 TABLET ORAL 2 TIMES DAILY
Qty: 10 TABLET | Refills: 0 | Status: SHIPPED | OUTPATIENT
Start: 2025-08-18 | End: 2025-08-23

## 2025-09-04 DIAGNOSIS — M19.90 OSTEOARTHRITIS, UNSPECIFIED OSTEOARTHRITIS TYPE, UNSPECIFIED SITE: ICD-10-CM

## 2025-09-05 ENCOUNTER — TELEPHONE (OUTPATIENT)
Dept: FAMILY MEDICINE | Facility: CLINIC | Age: 70
End: 2025-09-05
Payer: MEDICARE

## 2025-09-05 RX ORDER — OXYCODONE HYDROCHLORIDE 10 MG/1
10 TABLET ORAL EVERY 12 HOURS PRN
Qty: 45 TABLET | Refills: 0 | Status: SHIPPED | OUTPATIENT
Start: 2025-09-05